# Patient Record
Sex: FEMALE | Race: WHITE | Employment: UNEMPLOYED | URBAN - METROPOLITAN AREA
[De-identification: names, ages, dates, MRNs, and addresses within clinical notes are randomized per-mention and may not be internally consistent; named-entity substitution may affect disease eponyms.]

---

## 2017-03-22 ENCOUNTER — OFFICE VISIT (OUTPATIENT)
Dept: OBGYN | Facility: CLINIC | Age: 52
End: 2017-03-22
Payer: COMMERCIAL

## 2017-03-22 ENCOUNTER — RADIANT APPOINTMENT (OUTPATIENT)
Dept: MAMMOGRAPHY | Facility: CLINIC | Age: 52
End: 2017-03-22
Payer: COMMERCIAL

## 2017-03-22 VITALS
HEIGHT: 66 IN | WEIGHT: 147 LBS | SYSTOLIC BLOOD PRESSURE: 122 MMHG | BODY MASS INDEX: 23.63 KG/M2 | DIASTOLIC BLOOD PRESSURE: 86 MMHG

## 2017-03-22 DIAGNOSIS — Z12.11 SCREEN FOR COLON CANCER: ICD-10-CM

## 2017-03-22 DIAGNOSIS — Z13.29 SCREENING FOR THYROID DISORDER: ICD-10-CM

## 2017-03-22 DIAGNOSIS — Z01.419 ENCOUNTER FOR GYNECOLOGICAL EXAMINATION WITHOUT ABNORMAL FINDING: Primary | ICD-10-CM

## 2017-03-22 DIAGNOSIS — Z13.220 ENCOUNTER FOR LIPID SCREENING FOR CARDIOVASCULAR DISEASE: ICD-10-CM

## 2017-03-22 DIAGNOSIS — Z12.31 VISIT FOR SCREENING MAMMOGRAM: ICD-10-CM

## 2017-03-22 DIAGNOSIS — B02.8 HERPES ZOSTER WITH COMPLICATION: ICD-10-CM

## 2017-03-22 DIAGNOSIS — Z13.1 SCREENING FOR DIABETES MELLITUS: ICD-10-CM

## 2017-03-22 DIAGNOSIS — A60.00 HERPES SIMPLEX INFECTION OF GENITOURINARY SYSTEM: ICD-10-CM

## 2017-03-22 DIAGNOSIS — Z13.6 ENCOUNTER FOR LIPID SCREENING FOR CARDIOVASCULAR DISEASE: ICD-10-CM

## 2017-03-22 LAB — GLUCOSE BLD-MCNC: 91 MG/DL (ref 70–99)

## 2017-03-22 PROCEDURE — 36415 COLL VENOUS BLD VENIPUNCTURE: CPT | Performed by: OBSTETRICS & GYNECOLOGY

## 2017-03-22 PROCEDURE — G0202 SCR MAMMO BI INCL CAD: HCPCS | Mod: TC

## 2017-03-22 PROCEDURE — 99396 PREV VISIT EST AGE 40-64: CPT | Performed by: OBSTETRICS & GYNECOLOGY

## 2017-03-22 PROCEDURE — 80061 LIPID PANEL: CPT | Performed by: OBSTETRICS & GYNECOLOGY

## 2017-03-22 PROCEDURE — 84443 ASSAY THYROID STIM HORMONE: CPT | Performed by: OBSTETRICS & GYNECOLOGY

## 2017-03-22 PROCEDURE — G0145 SCR C/V CYTO,THINLAYER,RESCR: HCPCS | Performed by: OBSTETRICS & GYNECOLOGY

## 2017-03-22 PROCEDURE — 82947 ASSAY GLUCOSE BLOOD QUANT: CPT | Performed by: OBSTETRICS & GYNECOLOGY

## 2017-03-22 RX ORDER — VALACYCLOVIR HYDROCHLORIDE 1 G/1
1000 TABLET, FILM COATED ORAL 3 TIMES DAILY
Qty: 21 TABLET | Refills: 1 | Status: SHIPPED | OUTPATIENT
Start: 2017-03-22 | End: 2019-03-06

## 2017-03-22 RX ORDER — VALACYCLOVIR HYDROCHLORIDE 1 G/1
1000 TABLET, FILM COATED ORAL 3 TIMES DAILY
Qty: 21 TABLET | Refills: 1 | Status: CANCELLED | OUTPATIENT
Start: 2017-03-22

## 2017-03-22 ASSESSMENT — ANXIETY QUESTIONNAIRES
3. WORRYING TOO MUCH ABOUT DIFFERENT THINGS: NOT AT ALL
1. FEELING NERVOUS, ANXIOUS, OR ON EDGE: NOT AT ALL
GAD7 TOTAL SCORE: 0
5. BEING SO RESTLESS THAT IT IS HARD TO SIT STILL: NOT AT ALL
6. BECOMING EASILY ANNOYED OR IRRITABLE: NOT AT ALL
7. FEELING AFRAID AS IF SOMETHING AWFUL MIGHT HAPPEN: NOT AT ALL
2. NOT BEING ABLE TO STOP OR CONTROL WORRYING: NOT AT ALL
IF YOU CHECKED OFF ANY PROBLEMS ON THIS QUESTIONNAIRE, HOW DIFFICULT HAVE THESE PROBLEMS MADE IT FOR YOU TO DO YOUR WORK, TAKE CARE OF THINGS AT HOME, OR GET ALONG WITH OTHER PEOPLE: NOT DIFFICULT AT ALL

## 2017-03-22 ASSESSMENT — PATIENT HEALTH QUESTIONNAIRE - PHQ9: 5. POOR APPETITE OR OVEREATING: NOT AT ALL

## 2017-03-22 NOTE — PROGRESS NOTES
Coby is a 52 year old  female who presents for annual exam.     Besides routine health maintenance, she has no other health concerns today .    HPI: The patient is seen at this time for her annual exam. She has had a supracervical hysterectomy. She is on no replacement therapy and occasionally has hot flashes but no dyspareunia. She denies any stress incontinence but does occasionally have some over flow issues if she has not attended to her bladder very well.Pelvic Exam:  External Genitalia:     Normal appearance for age, no discharge present, no tenderness present, no inflammatory lesions present, color normal  Vagina:     Normal vaginal vault without central or paravaginal defects, no discharge present, no inflammatory lesions present, no masses present  Bladder:     Nontender to palpation  Urethra:   Urethral Body:  Urethra palpation normal, urethra structural support normal   Urethral Meatus:  No erythema or lesions present  Cervix:     Appearance healthy, no lesions present, nontender to palpation, no bleeding present  Uterus:     Surgically absent  Adnexa:     No adnexal tenderness present, no adnexal masses present  Perineum:     Perineum within normal limits, no evidence of trauma, no rashes or skin lesions present  Anus:     Anus within normal limits, no hemorrhoids present  Inguinal Lymph Nodes:     No lymphadenopathy present  Pubic Hair:     Normal pubic hair distribution for age  Genitalia and Groin:     No rashes present, no lesions present, no areas of discoloration, no masses present  The patient's PCP is Kady Lerner at [facility]         GYNECOLOGIC HISTORY:    Patient's last menstrual period was 2008.  Her current contraception method is: hysterectomy.  She  reports that she has never smoked. She has never used smokeless tobacco.    Patient is sexually active.  STD testing offered?  Declined  Last PHQ-9 score on record =   PHQ-9 SCORE 3/22/2017   Total Score 4     Last GAD7  score on record =   ANA-7 SCORE 3/22/2017   Total Score 0     Alcohol Score = 1    HEALTH MAINTENANCE:  Cholesterol: (  Cholesterol   Date Value Ref Range Status   2010 186 0 - 200 mg/dL Final     Comment:     LDL Cholesterol is the primary guide to therapy.   The NCEP recommends further evaluation of: patients with cholesterol <200   mg/dL   if additional risk factors are present, cholesterol >240 mg/dL, triglycerides   >150 mg/dL, or HDL <40 mg/dL.   2008 157 0 - 200 mg/dL Final     Comment:     LDL Cholesterol is the primary guide to therapy: LDL-cholesterol goal in high   risk patients is <100 mg/dL and in very high risk patients is <70 mg/dL.   The NCEP recommends further evaluation of: patients with cholesterol <200 mg/dL   if additional risk factors are present, cholesterol >240 mg/dL, triglycerides   >150 mg/dL, or HDL <40 mg/dL.    5/21/10 Total-186, Trigs-95, HDL-51, LDL-116  Last Mammo: one year ago, Result: normal, Next Mammo: today   Pap:   Colonoscopy:  ifit testing1 year ago, Result: normal, Next Colonoscopy: this years.  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 Vaughn -ob/gyn      Other chronic serous otitis media     EDEMA - right lower extremity - p dermoid teratoma     Uterine leiomyoma     CARDIOVASCULAR SCREENING; LDL GOAL LESS THAN 160     Past Surgical History:   Procedure Laterality Date     HYSTERECTOMY, PAP STILL INDICATED  2009    lap. supracervical hyst with left ovary left in place      SURGICAL HISTORY OF -   at age 26    removal of right ovarian teratoma -  "atrophic right ovary still in place      SURGICAL HISTORY OF -   at age 13     strabismus surgery on both eyes      SURGICAL HISTORY OF -       endometriosis surgery - with some laser - laparoscopic       Social History   Substance Use Topics     Smoking status: Never Smoker     Smokeless tobacco: Never Used     Alcohol use Yes      Comment: 2 to 3 drinks a year.      Problem (# of Occurrences) Relation (Name,Age of Onset)    CANCER (1) Father: stage IV sarcoma of left triceps at age 80  - removed at [facility]- 7/2008    CEREBROVASCULAR DISEASE (2) Maternal Grandfather, Paternal Grandfather    DIABETES (1) Maternal Grandfather    HEART DISEASE (1) Mother    Hypertension (2) Mother, Father    Lipids (2) Mother, Sister    Thyroid Disease (1) Mother            Current Outpatient Prescriptions   Medication Sig     valACYclovir (VALTREX) 1000 mg tablet Take 1 tablet by mouth 3 times daily.     Multiple Vitamin (MULTI-VITAMIN) per tablet Take 1 tablet by mouth daily.     Coenzyme Q10 100 MG CHEW Take  by mouth.     omega-3 fatty acids 1200 MG capsule Take 1 capsule by mouth daily.     meclizine (ANTIVERT) 25 MG tablet Take 1 tablet by mouth every 6 hours as needed.     FLUTICASONE PROPIONATE (NASAL) INHA 50 MCG/DOSE NA 2 sparys each nostril daily     No current facility-administered medications for this visit.      No Known Allergies    Past medical, surgical, social and family histories were reviewed and updated in EPIC.    ROS:   Endocrine: Decreased Libido    EXAM:  /86  Ht 1.664 m (5' 5.5\")  Wt 66.7 kg (147 lb)  LMP 03/19/2008  Breastfeeding? No  BMI 24.09 kg/m2   BMI: Body mass index is 24.09 kg/(m^2).    PHYSICAL EXAM:  Constitutional:  Appearance: Well nourished, well developed, alert, in no acute distress  Neck:  Lymph Nodes:  No lymphadenopathy present    Thyroid:  Gland size normal, nontender, no nodules or masses present  on palpation  Chest:  Respiratory Effort:  Breathing " unlabored  Cardiovascular:    Heart: Auscultation:  Regular rate, normal rhythm, no murmurs present  Breasts: Inspection of Breasts:  No lymphadenopathy present    Palpation of Breasts and Axillae:  No masses present on palpation, no  breast tenderness    Axillary Lymph Nodes:  No lymphadenopathy present  Gastrointestinal:   Abdominal Examination:  Abdomen nontender to palpation, tone normal without rigidity or guarding, no masses present, umbilicus without lesions   Liver and Spleen:  No hepatomegaly present, liver nontender to palpation    Hernias:  No hernias present  Lymphatic: Lymph Nodes:  No other lymphadenopathy present  Skin:  General Inspection:  No rashes present, no lesions present, no areas of  discoloration    Genitalia and Groin:  No rashes present, no lesions present, no areas of  discoloration, no masses present  Neurologic/Psychiatric:    Mental Status:  Oriented X3     Pelvic Exam:  External Genitalia:     Normal appearance for age, no discharge present, no tenderness present, no inflammatory lesions present, color normal  Vagina:     Normal vaginal vault without central or paravaginal defects, no discharge present, no inflammatory lesions present, no masses present  Bladder:     Nontender to palpation  Urethra:   Urethral Body:  Urethra palpation normal, urethra structural support normal   Urethral Meatus:  No erythema or lesions present  Cervix:     Appearance healthy, no lesions present, nontender to palpation, no bleeding present  Uterus:     Surgically absent  Adnexa:     No adnexal tenderness present, no adnexal masses present  Perineum:     Perineum within normal limits, no evidence of trauma, no rashes or skin lesions present  Anus:     Anus within normal limits, no hemorrhoids present  Inguinal Lymph Nodes:     No lymphadenopathy present  Pubic Hair:     Normal pubic hair distribution for age  Genitalia and Groin:     No rashes present, no lesions present, no areas of discoloration, no  masses present    COUNSELING:   Reviewed preventive health counseling, as reflected in patient instructions       Regular exercise       Healthy diet/nutrition    BMI: Body mass index is 24.09 kg/(m^2).      ASSESSMENT:  52 year old female with satisfactory annual exam.      PLAN: We will convey both her Pap smear and mammogram results. She does need  her fasting blood sugar and cholesterol panel this year. She will continue to be screened on an annual basis with Pap smears due to her past history.      Rick Vaughn MD

## 2017-03-23 LAB
CHOLEST SERPL-MCNC: 219 MG/DL
HDLC SERPL-MCNC: 53 MG/DL
LDLC SERPL CALC-MCNC: 144 MG/DL
NONHDLC SERPL-MCNC: 166 MG/DL
TRIGL SERPL-MCNC: 112 MG/DL
TSH SERPL DL<=0.005 MIU/L-ACNC: 2.07 MU/L (ref 0.4–4)

## 2017-03-23 ASSESSMENT — PATIENT HEALTH QUESTIONNAIRE - PHQ9: SUM OF ALL RESPONSES TO PHQ QUESTIONS 1-9: 4

## 2017-03-23 ASSESSMENT — ANXIETY QUESTIONNAIRES: GAD7 TOTAL SCORE: 0

## 2017-03-24 LAB
COPATH REPORT: NORMAL
PAP: NORMAL

## 2017-03-24 NOTE — PROGRESS NOTES
Coby      Your results are normal.  If further questions please give me a call.  Cholesterol and LDL(bad cholesterol) are borderline elevated.  Exercise and watching diet will help lower.  Repeat next hear.  Please call if questions.    Rick Ruvalcaba MD

## 2017-10-12 ENCOUNTER — TRANSFERRED RECORDS (OUTPATIENT)
Dept: HEALTH INFORMATION MANAGEMENT | Facility: CLINIC | Age: 52
End: 2017-10-12

## 2019-01-23 ENCOUNTER — TELEPHONE (OUTPATIENT)
Dept: OBGYN | Facility: CLINIC | Age: 54
End: 2019-01-23

## 2019-01-23 DIAGNOSIS — Z13.820 SCREENING FOR OSTEOPOROSIS: Primary | ICD-10-CM

## 2019-03-05 NOTE — PROGRESS NOTES
Coby is a 53 year old  female who presents for annual exam.     Besides routine health maintenance, she has no other health concerns today .    HPI: The patient is seen at this time for her annual exam.  She is doing on a daily basis with a father with lung cancer and hospice.  Her overall health is fine.  She is concerned about her weight but is working out on a regular basis.  She has no GYN complaints.  The patient's PCP is  Rick Vaughn MD.        GYNECOLOGIC HISTORY:    Patient's last menstrual period was 2008.  Her current contraception method is: hysterectomy.  She  reports that  has never smoked. she has never used smokeless tobacco.    Patient is sexually active.  STD testing offered?  Declined  Last PHQ-9 score on record =   PHQ-9 SCORE 3/6/2019   PHQ-9 Total Score 3     Last GAD7 score on record =   ANA-7 SCORE 3/6/2019   Total Score 4     Alcohol Score = 1    HEALTH MAINTENANCE:  Cholesterol: 3/22/2017   Total= 219, Triglycerides=112, HDL=53, YDQ=891  Cholesterol   Date Value Ref Range Status   2017 219 (H) <200 mg/dL Final     Comment:     Desirable:       <200 mg/dl   2010 186 0 - 200 mg/dL Final     Comment:     LDL Cholesterol is the primary guide to therapy.   The NCEP recommends further evaluation of: patients with cholesterol <200   mg/dL   if additional risk factors are present, cholesterol >240 mg/dL, triglycerides   >150 mg/dL, or HDL <40 mg/dL.      Last Mammo: 3/22/2017, Result: normal, Next Mammo: today   Pap: 3/22/2017 NIL  Lab Results   Component Value Date    PAP NIL 2017      Colonoscopy:  NA, Result: not applicable, Next Colonoscopy: Due/pt does fecal.  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 Vaughn -ob/gyn      Other chronic serous otitis media     EDEMA - right lower extremity - p dermoid teratoma     Uterine leiomyoma     CARDIOVASCULAR SCREENING; LDL GOAL LESS THAN 160     Past Surgical History:   Procedure Laterality Date     HYSTERECTOMY, PAP STILL INDICATED  1/2009    lap. supracervical hyst with left ovary left in place      SURGICAL HISTORY OF -   at age 26    removal of right ovarian teratoma - atrophic right ovary still in place      SURGICAL HISTORY OF -   at age 13     strabismus surgery on both eyes      SURGICAL HISTORY OF -       endometriosis surgery - with some laser - laparoscopic       Social History     Tobacco Use     Smoking status: Never Smoker     Smokeless tobacco: Never Used   Substance Use Topics     Alcohol use: Yes     Comment: 2 to 3 drinks a year.      Problem (# of Occurrences) Relation (Name,Age of Onset)    Cancer (1) Father: stage IV sarcoma of left triceps at age 80  - removed at [facility]- 7/2008    Cerebrovascular Disease (2) Maternal Grandfather, Paternal Grandfather    Diabetes (1) Maternal Grandfather    Heart Disease (1) Mother    Hypertension (2) Mother, Father    Lipids (2) Mother, Sister    Thyroid Disease (1) Mother            Current Outpatient Medications   Medication Sig     FLUTICASONE PROPIONATE (NASAL) INHA 50 MCG/DOSE NA 2 sparys each nostril daily     Multiple Vitamin (MULTI-VITAMIN) per tablet Take 1 tablet by mouth daily.     valACYclovir (VALTREX) 1000 mg tablet Take 1 tablet (1,000 mg) by mouth 3 times daily     No current facility-administered medications for this visit.      No Known Allergies    Past medical, surgical, social and family histories were reviewed and updated in EPIC.    ROS:   12 point review of systems negative other than symptoms noted  "below.  Constitutional: Weight Gain  Genitourinary: Hot Flashes    EXAM:  /84   Pulse 74   Ht 1.651 m (5' 5\")   Wt 68 kg (150 lb)   LMP 03/19/2008   Breastfeeding? No   BMI 24.96 kg/m     BMI: Body mass index is 24.96 kg/m .    PHYSICAL EXAM:  Constitutional:  Appearance: Well nourished, well developed, alert, in no acute distress  Neck:  Lymph Nodes:  No lymphadenopathy present    Thyroid:  Gland size normal, nontender, no nodules or masses present  on palpation  Chest:  Respiratory Effort:  Breathing unlabored  Cardiovascular:    Heart: Auscultation:  Regular rate, normal rhythm, no murmurs present  Breasts: Inspection of Breasts:  No lymphadenopathy present., Palpation of Breasts and Axillae:  No masses present on palpation, no breast tenderness., Axillary Lymph Nodes:  No lymphadenopathy present. and No nodularity, asymmetry or nipple discharge bilaterally.  Gastrointestinal:   Abdominal Examination:  Abdomen nontender to palpation, tone normal without rigidity or guarding, no masses present, umbilicus without lesions   Liver and Spleen:  No hepatomegaly present, liver nontender to palpation    Hernias:  No hernias present  Lymphatic: Lymph Nodes:  No other lymphadenopathy present  Skin:  General Inspection:  No rashes present, no lesions present, no areas of  discoloration    Genitalia and Groin:  No rashes present, no lesions present, no areas of  discoloration, no masses present  Neurologic/Psychiatric:    Mental Status:  Oriented X3     Pelvic Exam:  External Genitalia:     Normal appearance for age, no discharge present, no tenderness present, no inflammatory lesions present, color normal  Vagina:     Normal vaginal vault without central or paravaginal defects, no discharge present, no inflammatory lesions present, no masses present  Bladder:     Nontender to palpation  Urethra:   Urethral Body:  Urethra palpation normal, urethra structural support normal   Urethral Meatus:  No erythema or " lesions present  Cervix:     Appearance healthy, no lesions present, nontender to palpation, no bleeding present  Uterus:     Surgically absent  Adnexa:     No adnexal tenderness present, no adnexal masses present  Perineum:     Perineum within normal limits, no evidence of trauma, no rashes or skin lesions present  Anus:     Anus within normal limits, no hemorrhoids present  Inguinal Lymph Nodes:     No lymphadenopathy present  Pubic Hair:     Normal pubic hair distribution for age  Genitalia and Groin:     No rashes present, no lesions present, no areas of discoloration, no masses present      COUNSELING:   Reviewed preventive health counseling, as reflected in patient instructions       Regular exercise       Healthy diet/nutrition    BMI: Body mass index is 24.96 kg/m .      ASSESSMENT:  53 year old female with satisfactory annual exam.    ICD-10-CM    1. Encounter for gynecological examination without abnormal finding Z01.419 Pap imaged thin layer screen with HPV - recommended age 30 - 65     HPV High Risk Types DNA Cervical   2. Screening for cardiovascular condition Z13.6 Lipid panel reflex to direct LDL Fasting   3. Screening for thyroid disorder Z13.29 TSH   4. Screening for diabetes mellitus Z13.1 Glucose, whole blood   5. Herpes zoster with complication B02.8 valACYclovir (VALTREX) 1000 mg tablet       PLAN: We will contact the patient with her screening labs and mammogram.  Her bone densitometry is excellent.  We reinforced exercise calcium vitamin D and a good multivitamin.      Rick Vaughn MD

## 2019-03-06 ENCOUNTER — ANCILLARY PROCEDURE (OUTPATIENT)
Dept: MAMMOGRAPHY | Facility: CLINIC | Age: 54
End: 2019-03-06
Attending: OBSTETRICS & GYNECOLOGY
Payer: COMMERCIAL

## 2019-03-06 ENCOUNTER — OFFICE VISIT (OUTPATIENT)
Dept: OBGYN | Facility: CLINIC | Age: 54
End: 2019-03-06
Attending: OBSTETRICS & GYNECOLOGY
Payer: COMMERCIAL

## 2019-03-06 ENCOUNTER — ANCILLARY PROCEDURE (OUTPATIENT)
Dept: BONE DENSITY | Facility: CLINIC | Age: 54
End: 2019-03-06
Attending: OBSTETRICS & GYNECOLOGY
Payer: COMMERCIAL

## 2019-03-06 VITALS
BODY MASS INDEX: 24.99 KG/M2 | SYSTOLIC BLOOD PRESSURE: 122 MMHG | WEIGHT: 150 LBS | HEART RATE: 74 BPM | HEIGHT: 65 IN | DIASTOLIC BLOOD PRESSURE: 84 MMHG

## 2019-03-06 DIAGNOSIS — Z13.29 SCREENING FOR THYROID DISORDER: ICD-10-CM

## 2019-03-06 DIAGNOSIS — Z13.820 SCREENING FOR OSTEOPOROSIS: ICD-10-CM

## 2019-03-06 DIAGNOSIS — Z01.419 ENCOUNTER FOR GYNECOLOGICAL EXAMINATION WITHOUT ABNORMAL FINDING: Primary | ICD-10-CM

## 2019-03-06 DIAGNOSIS — Z13.6 SCREENING FOR CARDIOVASCULAR CONDITION: ICD-10-CM

## 2019-03-06 DIAGNOSIS — Z13.1 SCREENING FOR DIABETES MELLITUS: ICD-10-CM

## 2019-03-06 DIAGNOSIS — B02.8 HERPES ZOSTER WITH COMPLICATION: ICD-10-CM

## 2019-03-06 DIAGNOSIS — Z12.31 VISIT FOR SCREENING MAMMOGRAM: ICD-10-CM

## 2019-03-06 LAB — GLUCOSE BLD-MCNC: 85 MG/DL (ref 70–99)

## 2019-03-06 PROCEDURE — 77067 SCR MAMMO BI INCL CAD: CPT | Mod: TC

## 2019-03-06 PROCEDURE — 80061 LIPID PANEL: CPT | Performed by: OBSTETRICS & GYNECOLOGY

## 2019-03-06 PROCEDURE — 87624 HPV HI-RISK TYP POOLED RSLT: CPT | Performed by: OBSTETRICS & GYNECOLOGY

## 2019-03-06 PROCEDURE — 77080 DXA BONE DENSITY AXIAL: CPT | Mod: TC

## 2019-03-06 PROCEDURE — G0145 SCR C/V CYTO,THINLAYER,RESCR: HCPCS | Performed by: OBSTETRICS & GYNECOLOGY

## 2019-03-06 PROCEDURE — 36415 COLL VENOUS BLD VENIPUNCTURE: CPT | Performed by: OBSTETRICS & GYNECOLOGY

## 2019-03-06 PROCEDURE — 84443 ASSAY THYROID STIM HORMONE: CPT | Performed by: OBSTETRICS & GYNECOLOGY

## 2019-03-06 PROCEDURE — 82947 ASSAY GLUCOSE BLOOD QUANT: CPT | Performed by: OBSTETRICS & GYNECOLOGY

## 2019-03-06 PROCEDURE — 99396 PREV VISIT EST AGE 40-64: CPT | Performed by: OBSTETRICS & GYNECOLOGY

## 2019-03-06 PROCEDURE — G0124 SCREEN C/V THIN LAYER BY MD: HCPCS | Performed by: OBSTETRICS & GYNECOLOGY

## 2019-03-06 RX ORDER — VALACYCLOVIR HYDROCHLORIDE 1 G/1
1000 TABLET, FILM COATED ORAL 3 TIMES DAILY
Qty: 21 TABLET | Refills: 3 | Status: SHIPPED | OUTPATIENT
Start: 2019-03-06 | End: 2020-11-16

## 2019-03-06 ASSESSMENT — ANXIETY QUESTIONNAIRES
IF YOU CHECKED OFF ANY PROBLEMS ON THIS QUESTIONNAIRE, HOW DIFFICULT HAVE THESE PROBLEMS MADE IT FOR YOU TO DO YOUR WORK, TAKE CARE OF THINGS AT HOME, OR GET ALONG WITH OTHER PEOPLE: NOT DIFFICULT AT ALL
3. WORRYING TOO MUCH ABOUT DIFFERENT THINGS: SEVERAL DAYS
GAD7 TOTAL SCORE: 4
2. NOT BEING ABLE TO STOP OR CONTROL WORRYING: SEVERAL DAYS
7. FEELING AFRAID AS IF SOMETHING AWFUL MIGHT HAPPEN: SEVERAL DAYS
6. BECOMING EASILY ANNOYED OR IRRITABLE: NOT AT ALL
5. BEING SO RESTLESS THAT IT IS HARD TO SIT STILL: NOT AT ALL
1. FEELING NERVOUS, ANXIOUS, OR ON EDGE: NOT AT ALL

## 2019-03-06 ASSESSMENT — MIFFLIN-ST. JEOR: SCORE: 1286.28

## 2019-03-06 ASSESSMENT — PATIENT HEALTH QUESTIONNAIRE - PHQ9
SUM OF ALL RESPONSES TO PHQ QUESTIONS 1-9: 3
5. POOR APPETITE OR OVEREATING: SEVERAL DAYS

## 2019-03-07 LAB
CHOLEST SERPL-MCNC: 257 MG/DL
HDLC SERPL-MCNC: 59 MG/DL
LDLC SERPL CALC-MCNC: 178 MG/DL
NONHDLC SERPL-MCNC: 198 MG/DL
TRIGL SERPL-MCNC: 101 MG/DL
TSH SERPL DL<=0.005 MIU/L-ACNC: 2.18 MU/L (ref 0.4–4)

## 2019-03-07 ASSESSMENT — ANXIETY QUESTIONNAIRES: GAD7 TOTAL SCORE: 4

## 2019-03-12 LAB
COPATH REPORT: NORMAL
PAP: NORMAL

## 2019-03-13 LAB
FINAL DIAGNOSIS: NORMAL
HPV HR 12 DNA CVX QL NAA+PROBE: NEGATIVE
HPV16 DNA SPEC QL NAA+PROBE: NEGATIVE
HPV18 DNA SPEC QL NAA+PROBE: NEGATIVE
SPECIMEN DESCRIPTION: NORMAL
SPECIMEN SOURCE CVX/VAG CYTO: NORMAL

## 2019-10-03 DIAGNOSIS — Z12.11 SCREEN FOR COLON CANCER: ICD-10-CM

## 2019-10-03 PROCEDURE — 82274 ASSAY TEST FOR BLOOD FECAL: CPT | Performed by: OBSTETRICS & GYNECOLOGY

## 2019-10-05 LAB — HEMOCCULT STL QL IA: NEGATIVE

## 2020-07-17 NOTE — PROGRESS NOTES
Coby is a 55 year old  female who presents for annual exam.     Besides routine health maintenance, she has no other health concerns today .    HPI: Patient is seen for annual exam at this time.  She is menopausal and has a terrible sleep pattern with hot flashes night sweats and now avoids sexual activity due to vaginal dryness and irritation.  She has no family members with breast cancer, ovarian cancer or cardiovascular disease.  Her father  of lung cancer this last fall.  The patient does not have a PCP        GYNECOLOGIC HISTORY:    Patient's last menstrual period was 2008.      Her current contraception method is: menopause.  She  reports that she has never smoked. She has never used smokeless tobacco.    Patient is sexually active.  STD testing offered?  Declined  Last PHQ-9 score on record =   PHQ-9 SCORE 3/6/2019   PHQ-9 Total Score 3     Last GAD7 score on record =   ANA-7 SCORE 3/6/2019   Total Score 4     Alcohol Score = 2    HEALTH MAINTENANCE:  Cholesterol:   Cholesterol   Date Value Ref Range Status   2019 257 (H) <200 mg/dL Final     Comment:     Desirable:       <200 mg/dl   2017 219 (H) <200 mg/dL Final     Comment:     Desirable:       <200 mg/dl      Last Mammo: One year ago, Result: Normal, Next Mammo: Today   Pap:   Lab Results   Component Value Date    PAP NIL 2019    PAP NIL 2017    3/6/19 WNL HPV (-)neg  Colonoscopy:  Does fecal occult- 10/2/19, Result: Normal, Next Colonoscopy: rpt annually   Dexa:  3/6/19    Health maintenance updated:  yes    HISTORY:  OB History    Para Term  AB Living   1 1 1 0 0 1   SAB TAB Ectopic Multiple Live Births   0 0 0 0 1      # Outcome Date GA Lbr Benny/2nd Weight Sex Delivery Anes PTL Lv   1 Term 00 40w0d  3.033 kg (6 lb 11 oz) F    LATHA      Birth Comments: pre-eclampsia       Name: Quentin        Patient Active Problem List   Diagnosis     Female infertility     BENIGN NEOPLASM OVARY-dermoid  teratoma  in HS-s/p right oophorectomy      OVARIAN ENDOMETRIOSIS- Dr. Vaughn -ob/gyn      Other chronic serous otitis media     EDEMA - right lower extremity - p dermoid teratoma     Uterine leiomyoma     CARDIOVASCULAR SCREENING; LDL GOAL LESS THAN 160     Screening for cervical cancer     Past Surgical History:   Procedure Laterality Date     HYSTERECTOMY, PAP STILL INDICATED  1/2009    lap. supracervical hyst with left ovary left in place      SURGICAL HISTORY OF -   at age 26    removal of right ovarian teratoma - atrophic right ovary still in place      SURGICAL HISTORY OF -   at age 13     strabismus surgery on both eyes      SURGICAL HISTORY OF -       endometriosis surgery - with some laser - laparoscopic       Social History     Tobacco Use     Smoking status: Never Smoker     Smokeless tobacco: Never Used   Substance Use Topics     Alcohol use: Yes     Comment: 2 to 3 drinks a year.      Problem (# of Occurrences) Relation (Name,Age of Onset)    Cancer (1) Father: stage IV sarcoma of left triceps at age 80  - removed [facility]- 7/2008    Cerebrovascular Disease (2) Maternal Grandfather, Paternal Grandfather    Diabetes (1) Maternal Grandfather    Heart Disease (1) Mother    Hypertension (2) Mother, Father    Lipids (2) Mother, Sister    Thyroid Disease (1) Mother            Current Outpatient Medications   Medication Sig     FLUTICASONE PROPIONATE (NASAL) INHA 50 MCG/DOSE NA 2 sparys each nostril daily     Multiple Vitamin (MULTI-VITAMIN) per tablet Take 1 tablet by mouth daily.     valACYclovir (VALTREX) 1000 mg tablet Take 1 tablet (1,000 mg) by mouth 3 times daily     No current facility-administered medications for this visit.      No Known Allergies    Past medical, surgical, social and family histories were reviewed and updated in EPIC.    ROS:   12 point review of systems negative other than symptoms noted below or in the HPI.  No urinary frequency or dysuria, bladder or kidney  "problems    EXAM:  Ht 1.651 m (5' 5\")   Wt 69.9 kg (154 lb)   LMP 03/19/2008   Breastfeeding No   BMI 25.63 kg/m     BMI: Body mass index is 25.63 kg/m .    PHYSICAL EXAM:  Constitutional:   Appearance: Well nourished, well developed, alert, in no acute distress  Neck:  Lymph Nodes:  No lymphadenopathy present    Thyroid:  Gland size normal, nontender, no nodules or masses present  on palpation  Chest:  Respiratory Effort:  Breathing unlabored  Cardiovascular:    Heart: Auscultation:  Regular rate, normal rhythm, no murmurs present  Breasts: Inspection of Breasts:  No lymphadenopathy present., Palpation of Breasts and Axillae:  No masses present on palpation, no breast tenderness., Axillary Lymph Nodes:  No lymphadenopathy present. and No nodularity, asymmetry or nipple discharge bilaterally.  Gastrointestinal:   Abdominal Examination:  Abdomen nontender to palpation, tone normal without rigidity or guarding, no masses present, umbilicus without lesions   Liver and Spleen:  No hepatomegaly present, liver nontender to palpation    Hernias:  No hernias present  Lymphatic: Lymph Nodes:  No other lymphadenopathy present  Skin:  General Inspection:  No rashes present, no lesions present, no areas of  discoloration  Neurologic:    Mental Status:  Oriented X3.  Normal strength and tone, sensory exam                grossly normal, mentation intact and speech normal.    Psychiatric:   Mentation appears normal and affect normal/bright.         Pelvic Exam:  External Genitalia:     Normal appearance for age, no discharge present, no tenderness present, no inflammatory lesions present, color normal  Vagina:     Normal vaginal vault without central or paravaginal defects, no discharge present, no inflammatory lesions present, no masses present  Bladder:     Nontender to palpation  Urethra:   Urethral Body:  Urethra palpation normal, urethra structural support normal   Urethral Meatus:  No erythema or lesions present  Cervix: "     Appearance healthy, no lesions present, nontender to palpation, no bleeding present  Uterus:     Surgically absent  Adnexa:     No adnexal tenderness present, no adnexal masses present  Perineum:     Perineum within normal limits, no evidence of trauma, no rashes or skin lesions present  Anus:     Anus within normal limits, no hemorrhoids present  Inguinal Lymph Nodes:     No lymphadenopathy present  Pubic Hair:     Normal pubic hair distribution for age  Genitalia and Groin:     No rashes present, no lesions present, no areas of discoloration, no masses present      COUNSELING:   Reviewed preventive health counseling, as reflected in patient instructions       Regular exercise       Healthy diet/nutrition    BMI: Body mass index is 25.63 kg/m .  Weight management plan: Discussed healthy diet and exercise guidelines    ASSESSMENT:  55 year old female with satisfactory annual exam.    ICD-10-CM    1. Encounter for gynecological examination without abnormal finding  Z01.419        PLAN: Healthy postmenopausal female who is finally become symptomatic enough that she would like to initiate both oral therapy for hot flashes and some vaginal cream for her atrophic vaginitis.  The risks and complications have been reviewed and accepted.  We will convey her Pap and mammogram results.      Rick Vaughn MD

## 2020-07-20 ENCOUNTER — OFFICE VISIT (OUTPATIENT)
Dept: OBGYN | Facility: CLINIC | Age: 55
End: 2020-07-20
Attending: OBSTETRICS & GYNECOLOGY
Payer: COMMERCIAL

## 2020-07-20 ENCOUNTER — ANCILLARY PROCEDURE (OUTPATIENT)
Dept: MAMMOGRAPHY | Facility: CLINIC | Age: 55
End: 2020-07-20
Attending: OBSTETRICS & GYNECOLOGY
Payer: COMMERCIAL

## 2020-07-20 VITALS
BODY MASS INDEX: 25.66 KG/M2 | HEART RATE: 70 BPM | DIASTOLIC BLOOD PRESSURE: 60 MMHG | WEIGHT: 154 LBS | SYSTOLIC BLOOD PRESSURE: 112 MMHG | HEIGHT: 65 IN

## 2020-07-20 DIAGNOSIS — Z13.6 ENCOUNTER FOR LIPID SCREENING FOR CARDIOVASCULAR DISEASE: ICD-10-CM

## 2020-07-20 DIAGNOSIS — Z13.1 SCREENING FOR DIABETES MELLITUS: ICD-10-CM

## 2020-07-20 DIAGNOSIS — N95.1 SYMPTOMS, SUCH AS FLUSHING, SLEEPLESSNESS, HEADACHE, LACK OF CONCENTRATION, ASSOCIATED WITH THE MENOPAUSE: ICD-10-CM

## 2020-07-20 DIAGNOSIS — Z13.29 SCREENING FOR THYROID DISORDER: ICD-10-CM

## 2020-07-20 DIAGNOSIS — Z12.11 SCREEN FOR COLON CANCER: ICD-10-CM

## 2020-07-20 DIAGNOSIS — Z01.419 ENCOUNTER FOR GYNECOLOGICAL EXAMINATION WITHOUT ABNORMAL FINDING: Primary | ICD-10-CM

## 2020-07-20 DIAGNOSIS — Z12.31 VISIT FOR SCREENING MAMMOGRAM: ICD-10-CM

## 2020-07-20 DIAGNOSIS — Z13.220 ENCOUNTER FOR LIPID SCREENING FOR CARDIOVASCULAR DISEASE: ICD-10-CM

## 2020-07-20 LAB — GLUCOSE BLD-MCNC: 81 MG/DL (ref 70–99)

## 2020-07-20 PROCEDURE — 80061 LIPID PANEL: CPT | Performed by: OBSTETRICS & GYNECOLOGY

## 2020-07-20 PROCEDURE — 87624 HPV HI-RISK TYP POOLED RSLT: CPT | Performed by: OBSTETRICS & GYNECOLOGY

## 2020-07-20 PROCEDURE — 82947 ASSAY GLUCOSE BLOOD QUANT: CPT | Performed by: OBSTETRICS & GYNECOLOGY

## 2020-07-20 PROCEDURE — 84443 ASSAY THYROID STIM HORMONE: CPT | Performed by: OBSTETRICS & GYNECOLOGY

## 2020-07-20 PROCEDURE — 77063 BREAST TOMOSYNTHESIS BI: CPT | Mod: TC

## 2020-07-20 PROCEDURE — 36415 COLL VENOUS BLD VENIPUNCTURE: CPT | Performed by: OBSTETRICS & GYNECOLOGY

## 2020-07-20 PROCEDURE — 77067 SCR MAMMO BI INCL CAD: CPT | Mod: TC

## 2020-07-20 PROCEDURE — G0123 SCREEN CERV/VAG THIN LAYER: HCPCS | Performed by: OBSTETRICS & GYNECOLOGY

## 2020-07-20 PROCEDURE — 99396 PREV VISIT EST AGE 40-64: CPT | Performed by: OBSTETRICS & GYNECOLOGY

## 2020-07-20 RX ORDER — CETIRIZINE HYDROCHLORIDE 10 MG/1
10 TABLET ORAL DAILY
COMMUNITY

## 2020-07-20 RX ORDER — ESTRADIOL 1 MG/1
1 TABLET ORAL DAILY
Qty: 90 TABLET | Refills: 3 | Status: SHIPPED | OUTPATIENT
Start: 2020-07-20 | End: 2021-07-07

## 2020-07-20 RX ORDER — VIT C/B6/B5/MAGNESIUM/HERB 173 50-5-6-5MG
CAPSULE ORAL
COMMUNITY
End: 2022-10-03

## 2020-07-20 RX ORDER — ESTRADIOL 0.1 MG/G
1 CREAM VAGINAL DAILY
Qty: 42.5 G | Refills: 6 | Status: SHIPPED | OUTPATIENT
Start: 2020-07-20 | End: 2021-04-16

## 2020-07-20 ASSESSMENT — ANXIETY QUESTIONNAIRES
IF YOU CHECKED OFF ANY PROBLEMS ON THIS QUESTIONNAIRE, HOW DIFFICULT HAVE THESE PROBLEMS MADE IT FOR YOU TO DO YOUR WORK, TAKE CARE OF THINGS AT HOME, OR GET ALONG WITH OTHER PEOPLE: SOMEWHAT DIFFICULT
6. BECOMING EASILY ANNOYED OR IRRITABLE: SEVERAL DAYS
7. FEELING AFRAID AS IF SOMETHING AWFUL MIGHT HAPPEN: SEVERAL DAYS
GAD7 TOTAL SCORE: 6
3. WORRYING TOO MUCH ABOUT DIFFERENT THINGS: SEVERAL DAYS
2. NOT BEING ABLE TO STOP OR CONTROL WORRYING: SEVERAL DAYS
5. BEING SO RESTLESS THAT IT IS HARD TO SIT STILL: NOT AT ALL
1. FEELING NERVOUS, ANXIOUS, OR ON EDGE: SEVERAL DAYS

## 2020-07-20 ASSESSMENT — PATIENT HEALTH QUESTIONNAIRE - PHQ9
SUM OF ALL RESPONSES TO PHQ QUESTIONS 1-9: 7
5. POOR APPETITE OR OVEREATING: SEVERAL DAYS

## 2020-07-20 ASSESSMENT — MIFFLIN-ST. JEOR: SCORE: 1294.42

## 2020-07-21 LAB
CHOLEST SERPL-MCNC: 276 MG/DL
HDLC SERPL-MCNC: 57 MG/DL
LDLC SERPL CALC-MCNC: 194 MG/DL
NONHDLC SERPL-MCNC: 219 MG/DL
TRIGL SERPL-MCNC: 127 MG/DL
TSH SERPL DL<=0.005 MIU/L-ACNC: 2.07 MU/L (ref 0.4–4)

## 2020-07-21 ASSESSMENT — ANXIETY QUESTIONNAIRES: GAD7 TOTAL SCORE: 6

## 2020-07-22 ENCOUNTER — MYC MEDICAL ADVICE (OUTPATIENT)
Dept: OBGYN | Facility: CLINIC | Age: 55
End: 2020-07-22

## 2020-07-22 DIAGNOSIS — E78.2 MIXED HYPERLIPIDEMIA: Primary | ICD-10-CM

## 2020-07-22 LAB
COPATH REPORT: NORMAL
PAP: NORMAL

## 2020-07-22 RX ORDER — SIMVASTATIN 5 MG
5 TABLET ORAL AT BEDTIME
Qty: 90 TABLET | Refills: 1 | Status: SHIPPED | OUTPATIENT
Start: 2020-07-22 | End: 2021-01-04

## 2020-07-22 NOTE — TELEPHONE ENCOUNTER
Replied via [employer] sent.   Future labs ordered for 6 month recheck of fasting labs and liver function.

## 2020-07-22 NOTE — TELEPHONE ENCOUNTER
Recent annual with lipids 7/20/20    Routing pt mychart message to provider to advise.  Coby Parson RN on 7/22/2020 at 11:02 AM

## 2020-08-26 DIAGNOSIS — Z12.11 SCREEN FOR COLON CANCER: ICD-10-CM

## 2020-08-26 PROCEDURE — 82274 ASSAY TEST FOR BLOOD FECAL: CPT | Performed by: OBSTETRICS & GYNECOLOGY

## 2020-09-01 LAB — HEMOCCULT STL QL IA: NEGATIVE

## 2020-10-16 NOTE — PROGRESS NOTES
SUBJECTIVE:                                                   Coby Poe is a 55 year old female who presents to clinic today for the following health issue(s):  No chief complaint on file.      HPI: The patient is seen at this time for follow-up of menopausal therapy.  The patient was having severe hot flashes and vaginal dryness and avoidance dyspareunia.  She has initiated the oral component and is now sleeping very well with no hot flashes.  She has had no headaches visual blurring or breast tenderness.  She did not  the vaginal cream due to expense but feels that there is more vaginal lubrication at this time and she is satisfied.  She has no other health issues that are complicating things other than the normal anxiety from the pandemic.      Patient's last menstrual period was 2008..     Patient is sexually active, .  Using hysterectomy for contraception.    reports that she has never smoked. She has never used smokeless tobacco.    STD testing offered?  Declined    Health maintenance updated:  yes    Today's PHQ-2 Score:   PHQ-2 (  [employer]) 3/6/2019   Q1: Little interest or pleasure in doing things 0   Q2: Feeling down, depressed or hopeless 1   PHQ-2 Score 1     Today's PHQ-9 Score:   PHQ-9 SCORE 2020   PHQ-9 Total Score 7     Today's ANA-7 Score:   ANA-7 SCORE 2020   Total Score 6       Problem list and histories reviewed & adjusted, as indicated.  Additional history: as documented.    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 Vaughn -ob/gyn      Other chronic serous otitis media     EDEMA - right lower extremity - p dermoid teratoma     Uterine leiomyoma     CARDIOVASCULAR SCREENING; LDL GOAL LESS THAN 160     Screening for cervical cancer     Past Surgical History:   Procedure Laterality Date     HYSTERECTOMY, PAP STILL INDICATED  2009     lap. supracervical hyst with left ovary left in place      SURGICAL HISTORY OF -   at age 26    removal of right ovarian teratoma - atrophic right ovary still in place      SURGICAL HISTORY OF -   at age 13     strabismus surgery on both eyes      SURGICAL HISTORY OF -       endometriosis surgery - with some laser - laparoscopic       Social History     Tobacco Use     Smoking status: Never Smoker     Smokeless tobacco: Never Used   Substance Use Topics     Alcohol use: Yes     Comment: 2 to 3 drinks a year.      Problem (# of Occurrences) Relation (Name,Age of Onset)    Cancer (1) Father (80): stage IV sarcoma of left triceps at age 80  - removed at [facility]- 7/2008    Cerebrovascular Disease (2) Maternal Grandfather, Paternal Grandfather    Diabetes (1) Maternal Grandfather    Heart Disease (1) Mother    Hypertension (2) Mother, Father    Lipids (2) Mother, Sister    No Known Problems (5) Sister, Brother, Maternal Grandmother, Paternal Grandmother, Other    Thyroid Disease (1) Mother            Current Outpatient Medications   Medication Sig     cetirizine (ZYRTEC) 10 MG tablet Take 10 mg by mouth daily     estradiol (ESTRACE VAGINAL) 0.1 MG/GM vaginal cream Place 1 g vaginally daily     estradiol (ESTRACE) 1 MG tablet Take 1 tablet (1 mg) by mouth daily     FLUTICASONE PROPIONATE (NASAL) INHA 50 MCG/DOSE NA 2 sparys each nostril daily     Multiple Vitamin (MULTI-VITAMIN) per tablet Take 1 tablet by mouth daily.     simvastatin (ZOCOR) 5 MG tablet Take 1 tablet (5 mg) by mouth At Bedtime     Turmeric 500 MG CAPS      valACYclovir (VALTREX) 1000 mg tablet Take 1 tablet (1,000 mg) by mouth 3 times daily     No current facility-administered medications for this visit.      No Known Allergies    ROS:  12 point review of systems negative other than symptoms noted below or in the HPI.  No urinary frequency or dysuria, bladder or kidney problems      OBJECTIVE:     LMP 03/19/2008   There is no height or weight on file  to calculate BMI.    Exam:  Constitutional:  Appearance: Well nourished, well developed alert, in no acute distress     In-Clinic Test Results:      ASSESSMENT/PLAN:                                                        Excellent follow-up of initiation of estrogen replacement therapy.  The patient is doing very well with positive effects and no side effects.  She did not initiate the vaginal cream due to expense but seems to be better on that front also.  She has questions of when to follow-up her elevated cholesterol for which she initiated simvastatin therapy.  She has no side effects of that medication.  We will follow-up CMP and CBC in the spring.          Rick Vaughn MD  [facility] [place]

## 2020-10-19 ENCOUNTER — OFFICE VISIT (OUTPATIENT)
Dept: OBGYN | Facility: CLINIC | Age: 55
End: 2020-10-19
Payer: COMMERCIAL

## 2020-10-19 VITALS — BODY MASS INDEX: 26.29 KG/M2 | WEIGHT: 158 LBS | DIASTOLIC BLOOD PRESSURE: 78 MMHG | SYSTOLIC BLOOD PRESSURE: 118 MMHG

## 2020-10-19 DIAGNOSIS — N95.1 SYMPTOMS, SUCH AS FLUSHING, SLEEPLESSNESS, HEADACHE, LACK OF CONCENTRATION, ASSOCIATED WITH THE MENOPAUSE: ICD-10-CM

## 2020-10-19 DIAGNOSIS — Z13.228 SCREENING FOR METABOLIC DISORDER: Primary | ICD-10-CM

## 2020-10-19 DIAGNOSIS — Z13.0 SCREENING FOR DISORDER OF BLOOD AND BLOOD-FORMING ORGANS: ICD-10-CM

## 2020-10-19 PROCEDURE — 99212 OFFICE O/P EST SF 10 MIN: CPT | Performed by: OBSTETRICS & GYNECOLOGY

## 2020-11-16 ENCOUNTER — MYC REFILL (OUTPATIENT)
Dept: OBGYN | Facility: CLINIC | Age: 55
End: 2020-11-16

## 2020-11-16 DIAGNOSIS — B02.8 HERPES ZOSTER WITH COMPLICATION: ICD-10-CM

## 2020-11-16 RX ORDER — VALACYCLOVIR HYDROCHLORIDE 1 G/1
1000 TABLET, FILM COATED ORAL 3 TIMES DAILY
Qty: 21 TABLET | Refills: 3 | Status: SHIPPED | OUTPATIENT
Start: 2020-11-16 | End: 2021-11-05

## 2020-11-16 NOTE — TELEPHONE ENCOUNTER
"Requested Prescriptions   Pending Prescriptions Disp Refills     valACYclovir (VALTREX) 1000 mg tablet 21 tablet 3     Sig: Take 1 tablet (1,000 mg) by mouth 3 times daily       Antivirals for Herpes Protocol Failed - 11/16/2020 10:48 AM        Failed - Normal serum creatinine on file in past 12 months     No lab results found.    Ok to refill medication if creatinine is low          Passed - Patient is age 12 or older        Passed - Recent (12 mo) or future (30 days) visit within the authorizing provider's specialty     Patient has had an office visit with the authorizing provider or a provider within the authorizing providers department within the previous 12 mos or has a future within next 30 days. See \"Patient Info\" tab in inbasket, or \"Choose Columns\" in Meds & Orders section of the refill encounter.              Passed - Medication is active on med list           Last Written Prescription Date:  03/06/2019  Last Fill Quantity: 21,  # refills: 3   Last office visit: 10/19/2020 with prescribing provider:  Johana, Last annual exam 07/20/2020  Future Office Visit:      Prescription approved per [facility] Refill Protocol.  Melody Thakur RN on 11/16/2020 at 12:30 PM      "

## 2021-01-04 DIAGNOSIS — E78.2 MIXED HYPERLIPIDEMIA: ICD-10-CM

## 2021-01-04 RX ORDER — SIMVASTATIN 5 MG
TABLET ORAL
Qty: 90 TABLET | Refills: 0 | Status: SHIPPED | OUTPATIENT
Start: 2021-01-04 | End: 2021-02-18

## 2021-01-04 NOTE — TELEPHONE ENCOUNTER
"Requested Prescriptions   Pending Prescriptions Disp Refills     simvastatin (ZOCOR) 5 MG tablet [Pharmacy Med Name: SIMVASTATIN 5 MG TABLET] 90 tablet 1     Sig: TAKE 1 TABLET BY MOUTH AT BEDTIME       Statins Protocol Passed - 1/4/2021 11:48 AM        Passed - LDL on file in past 12 months     Recent Labs   Lab Test 07/20/20  0920   *             Passed - No abnormal creatine kinase in past 12 months     No lab results found.             Passed - Recent (12 mo) or future (30 days) visit within the authorizing provider's specialty     Patient has had an office visit with the authorizing provider or a provider within the authorizing providers department within the previous 12 mos or has a future within next 30 days. See \"Patient Info\" tab in inbasket, or \"Choose Columns\" in Meds & Orders section of the refill encounter.              Passed - Medication is active on med list        Passed - Patient is age 18 or older        Passed - No active pregnancy on record        Passed - No positive pregnancy test in past 12 months           Last Written Prescription Date:  7/22/20  Last Fill Quantity: 90,  # refills: 1   Last office visit: 10/19/2020 with prescribing provider:  Dr Vaughn   Future Office Visit:  none  Prescription approved per [facility] Refill Protocol.  Lexi Almaraz RN on 1/4/2021 at 12:05 PM          "

## 2021-02-18 DIAGNOSIS — E78.2 MIXED HYPERLIPIDEMIA: ICD-10-CM

## 2021-02-18 RX ORDER — SIMVASTATIN 5 MG
5 TABLET ORAL AT BEDTIME
Qty: 90 TABLET | Refills: 0 | Status: SHIPPED | OUTPATIENT
Start: 2021-02-18 | End: 2021-04-16

## 2021-02-18 NOTE — TELEPHONE ENCOUNTER
"Requested Prescriptions   Pending Prescriptions Disp Refills     simvastatin (ZOCOR) 5 MG tablet 90 tablet 0     Sig: Take 1 tablet (5 mg) by mouth At Bedtime       Statins Protocol Passed - 2/18/2021 11:20 AM        Passed - LDL on file in past 12 months     Recent Labs   Lab Test 07/20/20  0920   *             Passed - No abnormal creatine kinase in past 12 months     No lab results found.             Passed - Recent (12 mo) or future (30 days) visit within the authorizing provider's specialty     Patient has had an office visit with the authorizing provider or a provider within the authorizing providers department within the previous 12 mos or has a future within next 30 days. See \"Patient Info\" tab in inbasket, or \"Choose Columns\" in Meds & Orders section of the refill encounter.              Passed - Medication is active on med list        Passed - Patient is age 18 or older        Passed - No active pregnancy on record        Passed - No positive pregnancy test in past 12 months           Last Written Prescription Date:  1/4/21  Last Fill Quantity: 90,  # refills: 0   Last office visit: 10/19/2020 with prescribing provider:  Johaan   Future Office Visit:  none    Refill approved  Lexi Almaraz RN on 2/18/2021 at 11:39 AM        "

## 2021-04-16 ENCOUNTER — OFFICE VISIT (OUTPATIENT)
Dept: FAMILY MEDICINE | Facility: CLINIC | Age: 56
End: 2021-04-16
Payer: COMMERCIAL

## 2021-04-16 VITALS
DIASTOLIC BLOOD PRESSURE: 88 MMHG | HEART RATE: 76 BPM | SYSTOLIC BLOOD PRESSURE: 120 MMHG | WEIGHT: 146 LBS | BODY MASS INDEX: 24.32 KG/M2 | RESPIRATION RATE: 16 BRPM | HEIGHT: 65 IN | OXYGEN SATURATION: 100 % | TEMPERATURE: 97.7 F

## 2021-04-16 DIAGNOSIS — Z11.4 SCREENING FOR HIV (HUMAN IMMUNODEFICIENCY VIRUS): ICD-10-CM

## 2021-04-16 DIAGNOSIS — R60.0 LOCALIZED EDEMA: ICD-10-CM

## 2021-04-16 DIAGNOSIS — Z00.01 ENCOUNTER FOR ROUTINE ADULT MEDICAL EXAM WITH ABNORMAL FINDINGS: Primary | ICD-10-CM

## 2021-04-16 DIAGNOSIS — Z13.21 ENCOUNTER FOR VITAMIN DEFICIENCY SCREENING: ICD-10-CM

## 2021-04-16 DIAGNOSIS — Z12.11 SCREEN FOR COLON CANCER: ICD-10-CM

## 2021-04-16 DIAGNOSIS — N80.109 ENDOMETRIOSIS OF OVARY: ICD-10-CM

## 2021-04-16 DIAGNOSIS — E78.5 HYPERLIPIDEMIA LDL GOAL <130: Chronic | ICD-10-CM

## 2021-04-16 DIAGNOSIS — Z11.59 NEED FOR HEPATITIS C SCREENING TEST: ICD-10-CM

## 2021-04-16 DIAGNOSIS — D27.0 BENIGN NEOPLASM OF RIGHT OVARY: ICD-10-CM

## 2021-04-16 PROCEDURE — 99213 OFFICE O/P EST LOW 20 MIN: CPT | Mod: 25 | Performed by: FAMILY MEDICINE

## 2021-04-16 PROCEDURE — 99386 PREV VISIT NEW AGE 40-64: CPT | Performed by: FAMILY MEDICINE

## 2021-04-16 RX ORDER — SIMVASTATIN 5 MG
5 TABLET ORAL AT BEDTIME
Qty: 90 TABLET | Refills: 3 | Status: SHIPPED | OUTPATIENT
Start: 2021-04-16 | End: 2022-03-15

## 2021-04-16 ASSESSMENT — MIFFLIN-ST. JEOR: SCORE: 1253.13

## 2021-04-16 NOTE — PATIENT INSTRUCTIONS
[facility]  [address] [place] [id number]  Office: [phone]   Fax:    [phone]       Recommend calcium 1200mg daily and  vitamin D 1000iu daily in the summer and 2000iu in the fall, winter and spring, and daily exercise with some resistance training to help keep your bones strong. Recent research has shown that daily or every other day weight bearing exercise with resistance training is especially important in maintaining and increasing bone density and preventing osteoporosis.      To help lower your cholesterol and triglycerides:      Exercise for 30-45 minutes 4 days/week and   Try odorless fish oil or flax seed oil capsules - 3000-4000mg by   mouth daily, and try 2 or 3 of the below supplements:   an odor-free garlic supplement daily,  coenzyme q-10 @ 100mg by   mouth daily, red yeast rice extract 1200mg by mouth twice daily and   plant sterols such as Benecol : Three servings per day (1.5 g sitostanol   per 1 1/2 teaspoon [8 g] serving) or  Minute Maid  Heart [place]  orange   juice or  Rice Dream Heart [place]  beverages: Two 8 fluid ounce   servings daily.     Can also do a daily fiber therapy with citrucel or rodney fiber   (available at [facility]) to help reduce triglycerides and overall cholesterol.    Decreased to 1/2 or even 1/4 over the counter usual dose, if getting diarrhea/gas/bloating.    Or for  daily antioxidant/fiber therapy instead of citrucel or rodney fiber :   drink guy fresca - 1 scoop of guy seeds (one of natures superfoods -   available at [facility]) in 8 oz water with juice of 1/4-1/2 of a lime and    A little sweetener ( sugar) or Agave syrup or honey to taste. It makes   a slurry w/ the seeds. You chew the seeds a bit as you drink it .   Tastes a bit like a sweet -tart.      In addition for your general health, try  vitamin D 1000 IU daily,    folic acid 1mg daily and calcium citrate or carbonate 600mg by mouth   twice daily.  If you get constipation, try  taking your calcium with a   magnesium supplement.  Also should be taking multivitamin daily such   as centrum - 1/2 tablet twice  daily or 1 flintstones vitamin twice daily.     I also recommend a yogurt with active cultures daily or a probiotic   supplement 1-2 capsules daily (depending on formulation) daily for   good digestive health and immune function.     Look at your inflammatory foods:avoid or eliminate  processed flours, processed sugars, saturated fats.  Sometimes dairy and gluten  can increase inflammation for some people.         For anti-inflammatory foods: Try to stick with dark green leafy veggies, organic foods, beets, walnuts, green tea- especially matcha green tea, salmon, pomegranates, olive oil, fish oil, dark red tart cherries.   Also supplements with turmeric (look for one with black pepper in it also - can also find in combination with anam and curcumin - other natural anti-inflammatories)  can help as well.   Can find turmeric supplements in powder, liquid or capsules.  Ritchie Milk combines all the above - turmeric, black pepper, anam, and curcumin) - which you can mix with hot liquid or a nut based milk.          Thank you so much or choosing [facility]  for your Health Care. It was a pleasure seeing you at your visit today! Please contact us with any questions or concerns you may have.                   Steph Gutierrez MD                              To reach your [facility] care team after hours call:   [phone]    PLEASE NOTE OUR HOURS HAVE CHANGED secondary to COVID-19 coronavirus pandemic, as we are trying to minimize patient exposure to the virus,  which is now widespread in the [facility].  These hours may change with very little notice.  We apologize for any inconvenience.       Our current clinic hours are:          Monday- Thursday   7:00am - 6:00pm  in person.      Friday  7:00am- 5:00pm                        Saturday and Sunday : Closed to in person and virtual visits        We have telephone and virtual visit times available between    7:00am - 6pm on Monday-Friday as well.                                                Phone:  [phone]      Our pharmacy hours: Monday through Friday 9:00am to 5:00pm                        Saturday - 9:00 am to 12 noon       Sunday : Closed.              Phone:  [phone]              ###  Please note: at this time we are not accepting any walk-in visits. ###      There is also information available at our web site:  [email or web address]    If your provider ordered any lab tests and you do not receive the results within 10 business days, please call the clinic.    If you need a medication refill please contact your pharmacy.  Please allow 2 business days for your refill to be completed.    Our clinic offers telephone visits and e visits.  Please ask one of your team members to explain more.      Use [employer] (secure email communication and access to your chart) to send your primary care provider a message or make an appointment. Ask someone on your Team how to sign up for [employer].

## 2021-04-29 DIAGNOSIS — Z11.4 SCREENING FOR HIV (HUMAN IMMUNODEFICIENCY VIRUS): ICD-10-CM

## 2021-04-29 DIAGNOSIS — Z11.59 NEED FOR HEPATITIS C SCREENING TEST: ICD-10-CM

## 2021-04-29 DIAGNOSIS — R60.0 LOCALIZED EDEMA: ICD-10-CM

## 2021-04-29 DIAGNOSIS — E78.5 HYPERLIPIDEMIA LDL GOAL <130: ICD-10-CM

## 2021-04-29 DIAGNOSIS — Z13.21 ENCOUNTER FOR VITAMIN DEFICIENCY SCREENING: ICD-10-CM

## 2021-04-29 LAB
ALBUMIN SERPL-MCNC: 3.9 G/DL (ref 3.4–5)
ALP SERPL-CCNC: 53 U/L (ref 40–150)
ALT SERPL W P-5'-P-CCNC: 29 U/L (ref 0–50)
ANION GAP SERPL CALCULATED.3IONS-SCNC: 3 MMOL/L (ref 3–14)
AST SERPL W P-5'-P-CCNC: 25 U/L (ref 0–45)
BILIRUB SERPL-MCNC: 1 MG/DL (ref 0.2–1.3)
BUN SERPL-MCNC: 16 MG/DL (ref 7–30)
CALCIUM SERPL-MCNC: 9 MG/DL (ref 8.5–10.1)
CHLORIDE SERPL-SCNC: 110 MMOL/L (ref 94–109)
CHOLEST SERPL-MCNC: 191 MG/DL
CO2 SERPL-SCNC: 30 MMOL/L (ref 20–32)
CREAT SERPL-MCNC: 0.78 MG/DL (ref 0.52–1.04)
CREAT UR-MCNC: 83 MG/DL
ERYTHROCYTE [DISTWIDTH] IN BLOOD BY AUTOMATED COUNT: 13 % (ref 10–15)
GFR SERPL CREATININE-BSD FRML MDRD: 85 ML/MIN/{1.73_M2}
GLUCOSE SERPL-MCNC: 82 MG/DL (ref 70–99)
HCT VFR BLD AUTO: 38.7 % (ref 35–47)
HCV AB SERPL QL IA: NONREACTIVE
HDLC SERPL-MCNC: 58 MG/DL
HGB BLD-MCNC: 13 G/DL (ref 11.7–15.7)
HIV 1+2 AB+HIV1 P24 AG SERPL QL IA: NONREACTIVE
LDLC SERPL CALC-MCNC: 105 MG/DL
MCH RBC QN AUTO: 31.6 PG (ref 26.5–33)
MCHC RBC AUTO-ENTMCNC: 33.6 G/DL (ref 31.5–36.5)
MCV RBC AUTO: 94 FL (ref 78–100)
MICROALBUMIN UR-MCNC: 9 MG/L
MICROALBUMIN/CREAT UR: 11.02 MG/G CR (ref 0–25)
NONHDLC SERPL-MCNC: 133 MG/DL
PLATELET # BLD AUTO: 249 10E9/L (ref 150–450)
POTASSIUM SERPL-SCNC: 4.4 MMOL/L (ref 3.4–5.3)
PROT SERPL-MCNC: 6.9 G/DL (ref 6.8–8.8)
RBC # BLD AUTO: 4.11 10E12/L (ref 3.8–5.2)
SODIUM SERPL-SCNC: 143 MMOL/L (ref 133–144)
TRIGL SERPL-MCNC: 142 MG/DL
TSH SERPL DL<=0.005 MIU/L-ACNC: 2.96 MU/L (ref 0.4–4)
WBC # BLD AUTO: 5.8 10E9/L (ref 4–11)

## 2021-04-29 PROCEDURE — 86803 HEPATITIS C AB TEST: CPT | Performed by: FAMILY MEDICINE

## 2021-04-29 PROCEDURE — 82043 UR ALBUMIN QUANTITATIVE: CPT | Performed by: FAMILY MEDICINE

## 2021-04-29 PROCEDURE — 80053 COMPREHEN METABOLIC PANEL: CPT | Performed by: FAMILY MEDICINE

## 2021-04-29 PROCEDURE — 36415 COLL VENOUS BLD VENIPUNCTURE: CPT | Performed by: FAMILY MEDICINE

## 2021-04-29 PROCEDURE — 85027 COMPLETE CBC AUTOMATED: CPT | Performed by: FAMILY MEDICINE

## 2021-04-29 PROCEDURE — 80061 LIPID PANEL: CPT | Performed by: FAMILY MEDICINE

## 2021-04-29 PROCEDURE — 84443 ASSAY THYROID STIM HORMONE: CPT | Performed by: FAMILY MEDICINE

## 2021-04-29 PROCEDURE — 82306 VITAMIN D 25 HYDROXY: CPT | Performed by: FAMILY MEDICINE

## 2021-04-29 PROCEDURE — 87389 HIV-1 AG W/HIV-1&-2 AB AG IA: CPT | Performed by: FAMILY MEDICINE

## 2021-04-30 LAB
DEPRECATED CALCIDIOL+CALCIFEROL SERPL-MC: <44 UG/L (ref 20–75)
VITAMIN D2 SERPL-MCNC: <5 UG/L
VITAMIN D3 SERPL-MCNC: 39 UG/L

## 2021-05-12 PROBLEM — E78.5 HYPERLIPIDEMIA LDL GOAL <130: Status: ACTIVE | Noted: 2021-05-12

## 2021-05-12 PROBLEM — E78.2 MIXED HYPERLIPIDEMIA: Status: RESOLVED | Noted: 2021-04-16 | Resolved: 2021-05-12

## 2021-05-12 PROBLEM — E78.2 MIXED HYPERLIPIDEMIA: Status: ACTIVE | Noted: 2021-04-16

## 2021-07-07 DIAGNOSIS — N95.1 SYMPTOMS, SUCH AS FLUSHING, SLEEPLESSNESS, HEADACHE, LACK OF CONCENTRATION, ASSOCIATED WITH THE MENOPAUSE: ICD-10-CM

## 2021-07-07 RX ORDER — ESTRADIOL 1 MG/1
TABLET ORAL
Qty: 30 TABLET | Refills: 0 | Status: SHIPPED | OUTPATIENT
Start: 2021-07-07 | End: 2021-08-02

## 2021-07-07 NOTE — TELEPHONE ENCOUNTER
"Requested Prescriptions   Pending Prescriptions Disp Refills     estradiol (ESTRACE) 1 MG tablet [Pharmacy Med Name: ESTRADIOL 1 MG TABLET] 90 tablet 3     Sig: TAKE 1 TABLET BY MOUTH EVERY DAY       Hormone Replacement Therapy Passed - 7/7/2021 12:33 AM        Passed - Blood pressure under 140/90 in past 12 months     BP Readings from Last 3 Encounters:   04/16/21 120/88   10/19/20 118/78   07/20/20 112/60                 Passed - Recent (12 mo) or future (30 days) visit within the authorizing provider's specialty     Patient has had an office visit with the authorizing provider or a provider within the authorizing providers department within the previous 12 mos or has a future within next 30 days. See \"Patient Info\" tab in inbasket, or \"Choose Columns\" in Meds & Orders section of the refill encounter.              Passed - Patient has mammogram in past 2 years on file if age 50-75        Passed - Medication is active on med list        Passed - Patient is 18 years of age or older        Passed - No active pregnancy on record        Passed - No positive pregnancy test on record in past 12 months           Refilled for one month  Appointment needed for further refills  Lexi Almaraz RN on 7/7/2021 at 9:32 AM    "

## 2021-08-02 DIAGNOSIS — N95.1 SYMPTOMS, SUCH AS FLUSHING, SLEEPLESSNESS, HEADACHE, LACK OF CONCENTRATION, ASSOCIATED WITH THE MENOPAUSE: ICD-10-CM

## 2021-08-02 RX ORDER — ESTRADIOL 1 MG/1
TABLET ORAL
Qty: 30 TABLET | Refills: 0 | Status: SHIPPED | OUTPATIENT
Start: 2021-08-02 | End: 2021-08-26

## 2021-08-02 NOTE — TELEPHONE ENCOUNTER
"Requested Prescriptions   Pending Prescriptions Disp Refills     estradiol (ESTRACE) 1 MG tablet [Pharmacy Med Name: ESTRADIOL 1 MG TABLET] 30 tablet 0     Sig: TAKE 1 TABLET BY MOUTH EVERY DAY       Hormone Replacement Therapy Passed - 8/2/2021 11:01 AM        Passed - Blood pressure under 140/90 in past 12 months     BP Readings from Last 3 Encounters:   04/16/21 120/88   10/19/20 118/78   07/20/20 112/60                 Passed - Recent (12 mo) or future (30 days) visit within the authorizing provider's specialty     Patient has had an office visit with the authorizing provider or a provider within the authorizing providers department within the previous 12 mos or has a future within next 30 days. See \"Patient Info\" tab in inbasket, or \"Choose Columns\" in Meds & Orders section of the refill encounter.              Passed - Patient has mammogram in past 2 years on file if age 50-75        Passed - Medication is active on med list        Passed - Patient is 18 years of age or older        Passed - No active pregnancy on record        Passed - No positive pregnancy test on record in past 12 months           Prescription approved per [facility] Refill Protocol.  Annual exam due  Lexi Almaraz RN on 8/2/2021 at 11:16 AM      "

## 2021-08-09 ENCOUNTER — MYC MEDICAL ADVICE (OUTPATIENT)
Dept: FAMILY MEDICINE | Facility: CLINIC | Age: 56
End: 2021-08-09

## 2021-08-26 DIAGNOSIS — N95.1 SYMPTOMS, SUCH AS FLUSHING, SLEEPLESSNESS, HEADACHE, LACK OF CONCENTRATION, ASSOCIATED WITH THE MENOPAUSE: ICD-10-CM

## 2021-08-26 RX ORDER — ESTRADIOL 1 MG/1
TABLET ORAL
Qty: 30 TABLET | Refills: 0 | Status: SHIPPED | OUTPATIENT
Start: 2021-08-26 | End: 2021-11-05

## 2021-08-26 NOTE — TELEPHONE ENCOUNTER
"Requested Prescriptions   Pending Prescriptions Disp Refills     estradiol (ESTRACE) 1 MG tablet [Pharmacy Med Name: ESTRADIOL 1 MG TABLET] 30 tablet 0     Sig: TAKE 1 TABLET BY MOUTH EVERY DAY       Hormone Replacement Therapy Passed - 8/26/2021  1:19 AM        Passed - Blood pressure under 140/90 in past 12 months     BP Readings from Last 3 Encounters:   04/16/21 120/88   10/19/20 118/78   07/20/20 112/60                 Passed - Recent (12 mo) or future (30 days) visit within the authorizing provider's specialty     Patient has had an office visit with the authorizing provider or a provider within the authorizing providers department within the previous 12 mos or has a future within next 30 days. See \"Patient Info\" tab in inbasket, or \"Choose Columns\" in Meds & Orders section of the refill encounter.              Passed - Patient has mammogram in past 2 years on file if age 50-75        Passed - Medication is active on med list        Passed - Patient is 18 years of age or older        Passed - No active pregnancy on record        Passed - No positive pregnancy test on record in past 12 months           Last Written Prescription Date:  08/02/21  Last Fill Quantity: 30,  # refills: 0   Last office visit: 10/19/2020 with prescribing provider:  Johana   Future Office Visit: none found  Refill approved for one month  Appointment needed for further refills  Lexi Almaraz RN on 8/26/2021 at 9:13 AM          "

## 2021-09-18 ENCOUNTER — HEALTH MAINTENANCE LETTER (OUTPATIENT)
Age: 56
End: 2021-09-18

## 2021-09-19 DIAGNOSIS — N95.1 SYMPTOMS, SUCH AS FLUSHING, SLEEPLESSNESS, HEADACHE, LACK OF CONCENTRATION, ASSOCIATED WITH THE MENOPAUSE: ICD-10-CM

## 2021-09-19 RX ORDER — ESTRADIOL 1 MG/1
TABLET ORAL
Qty: 30 TABLET | Refills: 0 | OUTPATIENT
Start: 2021-09-19

## 2021-09-19 NOTE — TELEPHONE ENCOUNTER
"Requested Prescriptions   Pending Prescriptions Disp Refills     estradiol (ESTRACE) 1 MG tablet [Pharmacy Med Name: ESTRADIOL 1 MG TABLET] 30 tablet 0     Sig: TAKE 1 TABLET BY MOUTH EVERY DAY       Hormone Replacement Therapy Passed - 9/19/2021  7:33 AM        Passed - Blood pressure under 140/90 in past 12 months     BP Readings from Last 3 Encounters:   04/16/21 120/88   10/19/20 118/78   07/20/20 112/60                 Passed - Recent (12 mo) or future (30 days) visit within the authorizing provider's specialty     Patient has had an office visit with the authorizing provider or a provider within the authorizing providers department within the previous 12 mos or has a future within next 30 days. See \"Patient Info\" tab in inbasket, or \"Choose Columns\" in Meds & Orders section of the refill encounter.              Passed - Patient has mammogram in past 2 years on file if age 50-75        Passed - Medication is active on med list        Passed - Patient is 18 years of age or older        Passed - No active pregnancy on record        Passed - No positive pregnancy test on record in past 12 months           Last Written Prescription Date:  8/26/21  Last Fill Quantity: 30,  # refills: 0   Last office visit: 10/19/2020 with prescribing provider:  Dr Vaughn   Future Office Visit:      Annual was due 7/20/21  Pt due for annual, no appt scheduled. Pt already received one month extension. Rx denied.   Coby Parson RN on 9/19/2021 at 5:23 PM        "

## 2021-09-23 ENCOUNTER — MYC MEDICAL ADVICE (OUTPATIENT)
Dept: FAMILY MEDICINE | Facility: CLINIC | Age: 56
End: 2021-09-23

## 2021-09-23 DIAGNOSIS — Z12.31 VISIT FOR SCREENING MAMMOGRAM: Primary | ICD-10-CM

## 2021-09-23 DIAGNOSIS — E78.5 HYPERLIPIDEMIA LDL GOAL <130: ICD-10-CM

## 2021-09-27 NOTE — TELEPHONE ENCOUNTER
Please review and advise on my chart message below    Routing refill request to provider for review/approval because:  Medication is reported/historical    Gabrielle RED RN, BSN  [facility]

## 2021-09-29 NOTE — TELEPHONE ENCOUNTER
Routed to [facility] to schedule appointment for breast and pelvic exam.    Blueboxt message sent.    Gabrielle RED RN, BSN  -[facility]

## 2021-09-29 NOTE — TELEPHONE ENCOUNTER
Pt overdue for mammogram.   Didn't have annual gyn exam with Dr. Vaughn this year as yet.  I saw her for annual physical 4/16/2021, but didn't do a breast or pelvic exam as she was going to have them done with Dr. Vaughn, but apparently hasn't had that as yet and will not prior to end of year.      Please have pt schedule a 20 minute annual gyn exam with me in the next month or so. Ok to take a virtual visit spot.  Needs a physician breast exam.     Recent Labs   Lab Test 04/29/21  0748 07/20/20  0920   CHOL 191 276*   HDL 58 57   * 194*   TRIG 142 127      also needs a fasting lab only visit for lipids , ast, alt, ck levels in mid 10/2021.  Future orders placed in [facility] for labs. I placed her mammogram orders .

## 2021-09-29 NOTE — TELEPHONE ENCOUNTER
Patient has appt with Dr Vaughn in [facility], she will call and ask for refill from him until she is seen for her  Annual exam with him.       Nissa Quintero

## 2021-11-05 ENCOUNTER — OFFICE VISIT (OUTPATIENT)
Dept: OBGYN | Facility: CLINIC | Age: 56
End: 2021-11-05
Payer: COMMERCIAL

## 2021-11-05 ENCOUNTER — ANCILLARY PROCEDURE (OUTPATIENT)
Dept: MAMMOGRAPHY | Facility: CLINIC | Age: 56
End: 2021-11-05
Payer: COMMERCIAL

## 2021-11-05 VITALS
HEIGHT: 65 IN | DIASTOLIC BLOOD PRESSURE: 64 MMHG | WEIGHT: 146 LBS | SYSTOLIC BLOOD PRESSURE: 102 MMHG | BODY MASS INDEX: 24.32 KG/M2

## 2021-11-05 DIAGNOSIS — Z00.00 ANNUAL PHYSICAL EXAM: Primary | ICD-10-CM

## 2021-11-05 DIAGNOSIS — Z12.31 VISIT FOR SCREENING MAMMOGRAM: ICD-10-CM

## 2021-11-05 DIAGNOSIS — B02.8 HERPES ZOSTER WITH COMPLICATION: ICD-10-CM

## 2021-11-05 DIAGNOSIS — N95.1 SYMPTOMS, SUCH AS FLUSHING, SLEEPLESSNESS, HEADACHE, LACK OF CONCENTRATION, ASSOCIATED WITH THE MENOPAUSE: ICD-10-CM

## 2021-11-05 PROCEDURE — 77067 SCR MAMMO BI INCL CAD: CPT | Mod: TC | Performed by: RADIOLOGY

## 2021-11-05 PROCEDURE — 99396 PREV VISIT EST AGE 40-64: CPT | Performed by: OBSTETRICS & GYNECOLOGY

## 2021-11-05 PROCEDURE — 77063 BREAST TOMOSYNTHESIS BI: CPT | Mod: TC | Performed by: RADIOLOGY

## 2021-11-05 RX ORDER — ESTRADIOL 1 MG/1
1 TABLET ORAL DAILY
Qty: 90 TABLET | Refills: 3 | Status: SHIPPED | OUTPATIENT
Start: 2021-11-05 | End: 2022-10-03

## 2021-11-05 RX ORDER — VALACYCLOVIR HYDROCHLORIDE 1 G/1
1000 TABLET, FILM COATED ORAL 3 TIMES DAILY
Qty: 21 TABLET | Refills: 3 | Status: SHIPPED | OUTPATIENT
Start: 2021-11-05 | End: 2023-09-29

## 2021-11-05 ASSESSMENT — ANXIETY QUESTIONNAIRES
IF YOU CHECKED OFF ANY PROBLEMS ON THIS QUESTIONNAIRE, HOW DIFFICULT HAVE THESE PROBLEMS MADE IT FOR YOU TO DO YOUR WORK, TAKE CARE OF THINGS AT HOME, OR GET ALONG WITH OTHER PEOPLE: NOT DIFFICULT AT ALL
6. BECOMING EASILY ANNOYED OR IRRITABLE: SEVERAL DAYS
GAD7 TOTAL SCORE: 3
7. FEELING AFRAID AS IF SOMETHING AWFUL MIGHT HAPPEN: NOT AT ALL
3. WORRYING TOO MUCH ABOUT DIFFERENT THINGS: NOT AT ALL
5. BEING SO RESTLESS THAT IT IS HARD TO SIT STILL: NOT AT ALL
1. FEELING NERVOUS, ANXIOUS, OR ON EDGE: SEVERAL DAYS
2. NOT BEING ABLE TO STOP OR CONTROL WORRYING: SEVERAL DAYS

## 2021-11-05 ASSESSMENT — PATIENT HEALTH QUESTIONNAIRE - PHQ9
5. POOR APPETITE OR OVEREATING: NOT AT ALL
SUM OF ALL RESPONSES TO PHQ QUESTIONS 1-9: 2

## 2021-11-05 ASSESSMENT — MIFFLIN-ST. JEOR: SCORE: 1253.13

## 2021-11-05 NOTE — PROGRESS NOTES
Coby is a 56 year old  female who presents for annual exam.     Besides routine health maintenance, she would like to discuss hormone replacement therapy, which she started after her hysterectomy. She knows it helps especially with her sleep issues. She does want to continue, but discuss other formulations.    HPI:      Mammogram was done at 2:15.  The patient's PCP is Dr Steph Gutierrez MD.   She does the fasting labs.    She has stopped with the hot flashes.  Her sleep is better as well.      GYNECOLOGIC HISTORY:    Patient's last menstrual period was 2008.  She  reports that she has never smoked. She has never used smokeless tobacco.  Patient is sexually active.  STD testing offered?  Declined     Last PHQ-9 score on record =   PHQ-9 SCORE 2021   PHQ-9 Total Score 2     Last GAD7 score on record =   ANA-7 SCORE 2021   Total Score 3     Alcohol Score = 2    HEALTH MAINTENANCE:  Cholesterol: followed by PCP  Recent Labs   Lab Test 21  0748 20  0920   CHOL 191 276*   HDL 58 57   * 194*   TRIG 142 127     TSH   Date Value Ref Range Status   2021 2.96 0.40 - 4.00 mU/L Final     Last Mammo: 20, Result: Normal, Next Mammo: Today   Pap:   Lab Results   Component Value Date    PAP NIL, NEG-HPV 2020    PAP NIL, NEG-HPV 2019    PAP NIL 2017   Colonoscopy:  Does FIT testing,   Dexa:  3/6/19  Health maintenance updated:  yes    HISTORY:  OB History    Para Term  AB Living   1 1 1 0 0 1   SAB TAB Ectopic Multiple Live Births   0 0 0 0 1      # Outcome Date GA Lbr Benny/2nd Weight Sex Delivery Anes PTL Lv   1 Term 00 40w0d  3.033 kg (6 lb 11 oz) F    LATHA      Birth Comments: pre-eclampsia       Name: Quentin       Obstetric Comments   Daughter - Patricia - is 20 yrs old - noted 2021 - no hx of gestational diabetes, but hx of pre-eclampsia        Patient Active Problem List   Diagnosis     Female infertility     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 Vaughn -ob/gyn      Other chronic serous otitis media     EDEMA - right lower extremity - p dermoid teratoma     Uterine leiomyoma     CARDIOVASCULAR SCREENING; LDL GOAL LESS THAN 160     Screening for cervical cancer     Hyperlipidemia LDL goal <130     Past Surgical History:   Procedure Laterality Date     HYSTERECTOMY, PAP STILL INDICATED  2009    lap. supracervical hyst with left ovary left in place      SURGICAL HISTORY OF -   at age 26    removal of right ovarian teratoma - atrophic right ovary still in place      SURGICAL HISTORY OF -   at age 13     strabismus surgery on both eyes      SURGICAL HISTORY OF -       endometriosis surgery - with some laser - laparoscopic       Social History     Tobacco Use     Smoking status: Never Smoker     Smokeless tobacco: Never Used   Substance Use Topics     Alcohol use: Yes     Comment: 2 to 3 drinks a year.      Problem (# of Occurrences) Relation (Name,Age of Onset)    Breast Cancer (1) Father: did genetic counseling = negative.     Cancer (1) Father (80): stage IV sarcoma of left triceps at age 80  - removed at [facility]- 2008    Cardiac Sudden Death (1) Maternal Grandmother (52): massive MI at age 52     Cerebrovascular Disease (3) Mother (80): first one at age 80, then 2 small ones after that , Maternal Grandfather, Paternal Grandfather    Diabetes (1) Maternal Grandfather    Heart Disease (1) Mother    Hypertension (2) Mother, Father    Lipids (2) Mother, Sister ([place])    Lung Cancer (1) Father    Myocardial Infarction (1) Paternal Grandmother (88):  from first MI while playing cards     No Known Problems (3) Sister, Brother, Other    Thyroid Disease (1) Mother            Current Outpatient Medications   Medication Sig     cetirizine (ZYRTEC) 10 MG tablet Take 10 mg by mouth daily     estradiol (ESTRACE) 1 MG tablet Take 1 tablet (1 mg) by mouth daily  "    Multiple Vitamin (MULTI-VITAMIN) per tablet Take 1 tablet by mouth daily.     simvastatin (ZOCOR) 5 MG tablet Take 1 tablet (5 mg) by mouth At Bedtime     Turmeric 500 MG CAPS      valACYclovir (VALTREX) 1000 mg tablet Take 1 tablet (1,000 mg) by mouth 3 times daily     No current facility-administered medications for this visit.     No Known Allergies    Past medical, surgical, social and family histories were reviewed and updated in EPIC.    ROS:   12 point review of systems negative other than symptoms noted below or in the HPI.  No urinary frequency or dysuria, bladder or kidney problems    EXAM:  /64   Ht 1.651 m (5' 5\")   Wt 66.2 kg (146 lb)   LMP 03/19/2008   BMI 24.30 kg/m     BMI: Body mass index is 24.3 kg/m .    PHYSICAL EXAM:  Constitutional:   Appearance: Well nourished, well developed, alert, in no acute distress    Chest:  Respiratory Effort:  Breathing unlabored  Cardiovascular:    Heart: Auscultation:  Regular rate, normal rhythm, no murmurs present  Breasts: Inspection of Breasts:  No lymphadenopathy present., Palpation of Breasts and Axillae:  No masses present on palpation, no breast tenderness., Axillary Lymph Nodes:  No lymphadenopathy present. and No nodularity, asymmetry or nipple discharge bilaterally.    Lymphatic: Lymph Nodes:  No other lymphadenopathy present  Skin:  General Inspection:  No rashes present, no lesions present, no areas of  discoloration  Neurologic:    Mental Status:  Oriented X3.  Normal strength and tone, sensory exam                grossly normal, mentation intact and speech normal.    Psychiatric:   Mentation appears normal and affect normal/bright.         Pelvic Exam:  External Genitalia:     Normal appearance for age, no discharge present, no tenderness present, no inflammatory lesions present, color normal  Vagina:     Normal vaginal vault without central or paravaginal defects, no discharge present, no inflammatory lesions present, no masses " present  Bladder:     Nontender to palpation  Urethra:   Urethral Body:  Urethra palpation normal, urethra structural support normal   Urethral Meatus:  No erythema or lesions present  Cervix:     Appearance healthy, no lesions present, nontender to palpation, no bleeding present  Uterus:    Surgically absent  Adnexa:     No adnexal tenderness present, no adnexal masses present  Perineum:     Perineum within normal limits, no evidence of trauma, no rashes or skin lesions present  Anus:     Anus within normal limits, no hemorrhoids present  Inguinal Lymph Nodes:     No lymphadenopathy present  Pubic Hair:     Normal pubic hair distribution for age  Genitalia and Groin:     No rashes present, no lesions present, no areas of discoloration, no masses present      COUNSELING:   Reviewed preventive health counseling, as reflected in patient instructions    BMI: Body mass index is 24.3 kg/m .      ASSESSMENT:  56 year old female with satisfactory annual exam.    ICD-10-CM    1. Annual physical exam  Z00.00    2. Symptoms, such as flushing, sleeplessness, headache, lack of concentration, associated with the menopause  N95.1 estradiol (ESTRACE) 1 MG tablet   3. Herpes zoster with complication  B02.8 valACYclovir (VALTREX) 1000 mg tablet       PLAN:  1. Pap in 2020 was normal, not due for a pap smear at this time  2. Her pcp does her fasting labs  3. Discussed her hormones, will continue the medication.  She can half the medication or go every other day.  4. Will check if she wants to be off the hormones in a year      Angela Arredondo MD

## 2021-11-06 ASSESSMENT — ANXIETY QUESTIONNAIRES: GAD7 TOTAL SCORE: 3

## 2021-11-15 NOTE — RESULT ENCOUNTER NOTE
Your mammogram was normal.     Thank you so much for choosing [facility].  Please contact us with any questions that you may have.   We appreciate the opportunity to serve you now and look forward to supporting your healthcare needs for a long time to come!    Most Sincerely,     Steph Gutierrez MD

## 2021-12-18 NOTE — MR AVS SNAPSHOT
After Visit Summary   3/22/2017    Coby Poe    MRN: [id number]           Patient Information     Date Of Birth          1965        Visit Information        Provider Department      3/22/2017 1:30 PM Rick Vaughn MD [facility]        Today's Diagnoses     Encounter for gynecological examination without abnormal finding    -  1    Screen for colon cancer        Encounter for lipid screening for cardiovascular disease        Screening for thyroid disorder        Screening for diabetes mellitus        Herpes simplex infection of genitourinary system        Herpes zoster with complication           Follow-ups after your visit        Future tests that were ordered for you today     Open Future Orders        Priority Expected Expires Ordered    Fecal colorectal cancer screen (FIT) Routine 4/12/2017 6/14/2017 3/22/2017            Who to contact     If you have questions or need follow up information about today's clinic visit or your schedule please contact [facility] directly at [phone].  Normal or non-critical lab and imaging results will be communicated to you by MyChart, letter or phone within 4 business days after the clinic has received the results. If you do not hear from us within 7 days, please contact the clinic through [employer] or phone. If you have a critical or abnormal lab result, we will notify you by phone as soon as possible.  Submit refill requests through [employer] or call your pharmacy and they will forward the refill request to us. Please allow 3 business days for your refill to be completed.          Additional Information About Your Visit        MyChart Information     [employer] gives you secure access to your electronic health record. If you see a primary care provider, you can also send messages to your care team and make appointments. If you have questions, please call your primary care clinic.  If you do not have a primary  "care provider, please call [phone] and they will assist you.        Care EveryWhere ID     This is your Care EveryWhere ID. This could be used by other organizations to access your [facility] medical records  [id number]        Your Vitals Were     Height Last Period Breastfeeding? BMI (Body Mass Index)          5' 5.5\" (1.664 m) 03/19/2008 No 24.09 kg/m2         Blood Pressure from Last 3 Encounters:   03/22/17 122/86   12/06/12 102/72   03/14/12 94/72    Weight from Last 3 Encounters:   03/22/17 147 lb (66.7 kg)   12/06/12 142 lb (64.4 kg)   03/14/12 141 lb 8 oz (64.2 kg)              We Performed the Following     Glucose whole blood     Lipid panel reflex to direct LDL     Pap imaged thin layer screen reflex to HPV if ASCUS - recommended age 25 - 29 years     TSH with Free T4 Reflex          Where to get your medicines      These medications were sent to [place] [id number] [place] [facility] - [address]  [address] [place] [id number]     Phone:  [phone]     valACYclovir 1000 mg tablet          Primary Care Provider Office Phone # Fax #    Steph Gutierrez [phone]       [address] [address]        Thank you!     Thank you for choosing [facility]  for your care. Our goal is always to provide you with excellent care. Hearing back from our patients is one way we can continue to improve our services. Please take a few minutes to complete the written survey that you may receive in the mail after your visit with us. Thank you!             Your Updated Medication List - Protect others around you: Learn how to safely use, store and throw away your medicines at www.disposemymeds.org.          This list is accurate as of: 3/22/17  2:47 PM.  Always use your most recent med list.                   Brand Name Dispense Instructions for use    Coenzyme Q10 100 MG Chew      Take  by mouth.       FLUTICASONE " PROPIONATE (NASAL) INHA 50 MCG/DOSE NA     1 month    2 sparys each nostril daily       meclizine 25 MG tablet    ANTIVERT    30 tablet    Take 1 tablet by mouth every 6 hours as needed.       Multi-vitamin Tabs tablet   Generic drug:  multivitamin, therapeutic with minerals     100 tablet    Take 1 tablet by mouth daily.       omega-3 fatty acids 1200 MG capsule     180 capsule    Take 1 capsule by mouth daily.       valACYclovir 1000 mg tablet    VALTREX    21 tablet    Take 1 tablet (1,000 mg) by mouth 3 times daily          Patient

## 2022-06-08 DIAGNOSIS — E78.5 HYPERLIPIDEMIA LDL GOAL <130: Chronic | ICD-10-CM

## 2022-06-13 NOTE — TELEPHONE ENCOUNTER
Routing refill request to provider for review/approval because:  A break in medication and LDL needed    Last filled # 60 3/15/22    Rhiannon De León RN

## 2022-06-15 RX ORDER — SIMVASTATIN 5 MG
TABLET ORAL
Qty: 60 TABLET | Refills: 0 | Status: SHIPPED | OUTPATIENT
Start: 2022-06-15 | End: 2022-06-16

## 2022-06-15 NOTE — TELEPHONE ENCOUNTER
Overdue for follow up.  Please assist pt in making appt(s) for fasting lab only prior to 10/2022.  I will future labs.   Recent Labs   Lab Test 04/29/21  0748 07/20/20  0920   CHOL 191 276*   HDL 58 57   * 194*   TRIG 142 127     Future Appointments 6/15/2022 - 12/12/2022      Date Visit Type Length Department Provider     10/3/2022  8:00 AM LAB 15 min RV LABORATORY RV LAB    Location Instructions:     The clinic is located at [address] [place] [address] in [place].&nbsp; We offer free parking in our on-site lot.              10/3/2022  1:40 PM PREVENTATIVE ADULT            40 min RV FAMILY PRACTICE Steph Gutierrez MD

## 2022-06-16 NOTE — TELEPHONE ENCOUNTER
Kyra - pt needs fasting labs this month or next month - Please assist pt in making appt(s) for fasting lab only appt.  Future orders placed in epic for labs.     Future Appointments 6/16/2022 - 12/13/2022              Date Visit Type Length Department Provider     10/3/2022  8:00 AM LAB 15 min RV LABORATORY RV LAB    Location Instructions:     The clinic is located at [address] [place] [address] in [place].&nbsp; We offer free parking in our on-site lot.              10/3/2022  1:40 PM PREVENTATIVE ADULT            40 min RV FAMILY PRACTICE Steph Gutierrez MD

## 2022-07-12 ENCOUNTER — LAB (OUTPATIENT)
Dept: LAB | Facility: CLINIC | Age: 57
End: 2022-07-12
Payer: COMMERCIAL

## 2022-07-12 DIAGNOSIS — E78.5 HYPERLIPIDEMIA LDL GOAL <130: ICD-10-CM

## 2022-07-12 LAB
ALT SERPL W P-5'-P-CCNC: 18 U/L (ref 0–50)
AST SERPL W P-5'-P-CCNC: 21 U/L (ref 0–45)
CHOLEST SERPL-MCNC: 206 MG/DL
FASTING STATUS PATIENT QL REPORTED: YES
HDLC SERPL-MCNC: 62 MG/DL
LDLC SERPL CALC-MCNC: 118 MG/DL
NONHDLC SERPL-MCNC: 144 MG/DL
TRIGL SERPL-MCNC: 129 MG/DL

## 2022-07-12 PROCEDURE — 80061 LIPID PANEL: CPT

## 2022-07-12 PROCEDURE — 36415 COLL VENOUS BLD VENIPUNCTURE: CPT

## 2022-07-12 PROCEDURE — 84460 ALANINE AMINO (ALT) (SGPT): CPT

## 2022-07-12 PROCEDURE — 84450 TRANSFERASE (AST) (SGOT): CPT

## 2022-09-17 DIAGNOSIS — E78.5 HYPERLIPIDEMIA LDL GOAL <130: Chronic | ICD-10-CM

## 2022-09-19 RX ORDER — SIMVASTATIN 5 MG
TABLET ORAL
Qty: 90 TABLET | Refills: 0 | Status: SHIPPED | OUTPATIENT
Start: 2022-09-19 | End: 2022-10-03

## 2022-09-19 NOTE — TELEPHONE ENCOUNTER
Prescription approved per [facility] Refill Protocol.    Next 5 appointments (look out 90 days)    Oct 03, 2022  1:40 PM  (Arrive by 1:20 PM)  Adult Preventative Visit with Steph Gutierrez MD  [facility] ([facility] ) [address] [place] [id number]  [phone]         Amairani Sim RN  [facility]

## 2022-09-26 ENCOUNTER — TELEPHONE (OUTPATIENT)
Dept: FAMILY MEDICINE | Facility: CLINIC | Age: 57
End: 2022-09-26

## 2022-09-26 ASSESSMENT — ENCOUNTER SYMPTOMS
FREQUENCY: 0
WEAKNESS: 0
EYE PAIN: 0
HEARTBURN: 0
ABDOMINAL PAIN: 0
COUGH: 0
DIARRHEA: 0
DYSURIA: 0
NERVOUS/ANXIOUS: 1
ARTHRALGIAS: 0
CHILLS: 0
MYALGIAS: 0
BREAST MASS: 0
HEADACHES: 0
FEVER: 0
PARESTHESIAS: 0
JOINT SWELLING: 0
HEMATURIA: 0
SORE THROAT: 0
PALPITATIONS: 0
HEMATOCHEZIA: 0
NAUSEA: 0
SHORTNESS OF BREATH: 0
DIZZINESS: 0
CONSTIPATION: 0

## 2022-09-26 NOTE — TELEPHONE ENCOUNTER
Needs of attention regarding:  -Colon Cancer Screening    Health Maintenance Topics with due status: Overdue       Topic Date Due    ZOSTER IMMUNIZATION Never done    DTAP/TDAP/TD IMMUNIZATION 05/21/2020    COLORECTAL CANCER SCREENING 08/26/2021    ANNUAL REVIEW OF HM ORDERS 04/16/2022    INFLUENZA VACCINE 09/01/2022       Communication:  See Letter

## 2022-09-26 NOTE — LETTER
[address] [facility] [id number] [phone]                    September 26, 2022    Coby Poe  [address]  [facility] [address]    Dear Coby,    We care about your health and have reviewed your health plan and are making recommendations based on this review, to optimize your health.    You are in particular need of attention regarding:  -Colon Cancer Screening    We are recommending that you:    -schedule a COLONOSCOPY to look for colon cancer (due every 10 years or 5 years in higher risk situations.)       Colon cancer is now the second leading cause of cancer-related deaths in the [facility] for both men and women and there are over 130,000 new cases and 50,000 deaths per year from colon cancer.  Colonoscopies can prevent 90-95% of these deaths.  Problem lesions can be removed before they ever become cancer.  This test is not only looking for cancer, but also getting rid of precancerious lesions.      If you are under/uninsured, we recommend you contact the [employer] program. [employer] is a free colorectal cancer screening program that provides free colonoscopies for eligible under/uninsured [place] men and women. If you are interested in receiving a free colonoscopy, please call [employer] at [phone] (mention code [employer]) to see if you re eligible.    If you do not wish to do a colonoscopy at this time, there is another option. It is called a FIT test or Fecal Immunochemical Occult Blood Test (take home stool sample kit).  It does not replace the colonoscopy for colorectal cancer screening, but it can detect hidden bleeding in the lower colon.  It does need to be repeated every year and if a positive result is obtained, you would be referred for a colonoscopy.      If you have completed either one of these tests at another facility, please call with the details of when and where the tests were done and if they were  normal or not. Or have the records sent to our clinic so that we can best coordinate your care.    In addition, here is a list of due or overdue Health Maintenance reminders.    Health Maintenance Due   Topic Date Due     Zoster (Shingles) Vaccine (1 of 2) Never done     Diptheria Tetanus Pertussis (DTAP/TDAP/TD) Vaccine (2 - Td or Tdap) 05/21/2020     Colorectal Cancer Screening  08/26/2021     ANNUAL REVIEW OF HM ORDERS  04/16/2022     Flu Vaccine (1) 09/01/2022       To address the above recommendations, we encourage you to contact us at [phone], via "[employer]" or by contacting Central Scheduling toll free at [phone] 24 hours a day. They will assist you with finding the most convenient time and location.    Thank you for trusting [facility] and we appreciate the opportunity to serve you.  We look forward to supporting your healthcare needs in the future.    Healthy Regards,    Your [facility] Team

## 2022-10-03 ENCOUNTER — OFFICE VISIT (OUTPATIENT)
Dept: FAMILY MEDICINE | Facility: CLINIC | Age: 57
End: 2022-10-03
Payer: COMMERCIAL

## 2022-10-03 VITALS
HEART RATE: 79 BPM | BODY MASS INDEX: 24.66 KG/M2 | WEIGHT: 148 LBS | HEIGHT: 65 IN | OXYGEN SATURATION: 99 % | SYSTOLIC BLOOD PRESSURE: 108 MMHG | TEMPERATURE: 97.8 F | DIASTOLIC BLOOD PRESSURE: 70 MMHG

## 2022-10-03 DIAGNOSIS — N95.1 SYMPTOMS, SUCH AS FLUSHING, SLEEPLESSNESS, HEADACHE, LACK OF CONCENTRATION, ASSOCIATED WITH THE MENOPAUSE: ICD-10-CM

## 2022-10-03 DIAGNOSIS — Z23 NEED FOR TDAP VACCINATION: ICD-10-CM

## 2022-10-03 DIAGNOSIS — Z12.11 SCREEN FOR COLON CANCER: ICD-10-CM

## 2022-10-03 DIAGNOSIS — E78.5 HYPERLIPIDEMIA LDL GOAL <130: Chronic | ICD-10-CM

## 2022-10-03 DIAGNOSIS — R17 ELEVATED BILIRUBIN: ICD-10-CM

## 2022-10-03 DIAGNOSIS — Z00.00 ROUTINE GENERAL MEDICAL EXAMINATION AT A HEALTH CARE FACILITY: Primary | ICD-10-CM

## 2022-10-03 DIAGNOSIS — Z13.6 CARDIOVASCULAR SCREENING; LDL GOAL LESS THAN 160: ICD-10-CM

## 2022-10-03 DIAGNOSIS — M62.838 TRAPEZIUS MUSCLE SPASM: ICD-10-CM

## 2022-10-03 DIAGNOSIS — Z13.21 ENCOUNTER FOR VITAMIN DEFICIENCY SCREENING: ICD-10-CM

## 2022-10-03 DIAGNOSIS — Z12.31 VISIT FOR SCREENING MAMMOGRAM: ICD-10-CM

## 2022-10-03 LAB
ERYTHROCYTE [DISTWIDTH] IN BLOOD BY AUTOMATED COUNT: 12.8 % (ref 10–15)
HCT VFR BLD AUTO: 38.1 % (ref 35–47)
HGB BLD-MCNC: 12.9 G/DL (ref 11.7–15.7)
MCH RBC QN AUTO: 31.6 PG (ref 26.5–33)
MCHC RBC AUTO-ENTMCNC: 33.9 G/DL (ref 31.5–36.5)
MCV RBC AUTO: 93 FL (ref 78–100)
PLATELET # BLD AUTO: 306 10E3/UL (ref 150–450)
RBC # BLD AUTO: 4.08 10E6/UL (ref 3.8–5.2)
WBC # BLD AUTO: 6.9 10E3/UL (ref 4–11)

## 2022-10-03 PROCEDURE — 80061 LIPID PANEL: CPT | Performed by: FAMILY MEDICINE

## 2022-10-03 PROCEDURE — 90715 TDAP VACCINE 7 YRS/> IM: CPT | Performed by: FAMILY MEDICINE

## 2022-10-03 PROCEDURE — 84443 ASSAY THYROID STIM HORMONE: CPT | Performed by: FAMILY MEDICINE

## 2022-10-03 PROCEDURE — 36415 COLL VENOUS BLD VENIPUNCTURE: CPT | Performed by: FAMILY MEDICINE

## 2022-10-03 PROCEDURE — 99396 PREV VISIT EST AGE 40-64: CPT | Mod: 25 | Performed by: FAMILY MEDICINE

## 2022-10-03 PROCEDURE — 85027 COMPLETE CBC AUTOMATED: CPT | Performed by: FAMILY MEDICINE

## 2022-10-03 PROCEDURE — 99214 OFFICE O/P EST MOD 30 MIN: CPT | Mod: 25 | Performed by: FAMILY MEDICINE

## 2022-10-03 PROCEDURE — 90472 IMMUNIZATION ADMIN EACH ADD: CPT | Performed by: FAMILY MEDICINE

## 2022-10-03 PROCEDURE — 80053 COMPREHEN METABOLIC PANEL: CPT | Performed by: FAMILY MEDICINE

## 2022-10-03 PROCEDURE — 82306 VITAMIN D 25 HYDROXY: CPT | Performed by: FAMILY MEDICINE

## 2022-10-03 PROCEDURE — 90682 RIV4 VACC RECOMBINANT DNA IM: CPT | Performed by: FAMILY MEDICINE

## 2022-10-03 PROCEDURE — 90471 IMMUNIZATION ADMIN: CPT | Performed by: FAMILY MEDICINE

## 2022-10-03 PROCEDURE — 82043 UR ALBUMIN QUANTITATIVE: CPT | Performed by: FAMILY MEDICINE

## 2022-10-03 RX ORDER — ESTRADIOL 1 MG/1
1 TABLET ORAL DAILY
Qty: 90 TABLET | Refills: 3 | Status: SHIPPED | OUTPATIENT
Start: 2022-10-03 | End: 2023-10-04

## 2022-10-03 RX ORDER — CYCLOBENZAPRINE HCL 5 MG
5-10 TABLET ORAL 3 TIMES DAILY PRN
Qty: 21 TABLET | Refills: 1 | Status: SHIPPED | OUTPATIENT
Start: 2022-10-03 | End: 2023-12-20

## 2022-10-03 RX ORDER — SIMVASTATIN 5 MG
5 TABLET ORAL AT BEDTIME
Qty: 90 TABLET | Refills: 3 | Status: SHIPPED | OUTPATIENT
Start: 2022-10-03 | End: 2023-09-29

## 2022-10-03 ASSESSMENT — ENCOUNTER SYMPTOMS
MYALGIAS: 0
PALPITATIONS: 0
HEADACHES: 0
DYSURIA: 0
HEARTBURN: 0
FEVER: 0
ARTHRALGIAS: 0
ABDOMINAL PAIN: 0
WEAKNESS: 0
COUGH: 0
CONSTIPATION: 0
CHILLS: 0
NERVOUS/ANXIOUS: 1
JOINT SWELLING: 0
DIZZINESS: 0
EYE PAIN: 0
BREAST MASS: 0
NAUSEA: 0
PARESTHESIAS: 0
FREQUENCY: 0
HEMATURIA: 0
HEMATOCHEZIA: 0
SORE THROAT: 0
SHORTNESS OF BREATH: 0
DIARRHEA: 0

## 2022-10-03 NOTE — PROGRESS NOTES
SUBJECTIVE:   CC: Coby is an 57 year old who presents for preventive health visit and the following other medical problems:      1. Routine general medical examination at a health care facility    2. Trapezius muscle spasm-happened with pouring sand from 40lbs bag on 9/29/2022     3. Screen for colon cancer    4. Symptoms, such as flushing, sleeplessness, headache, lack of concentration, associated with the menopause    5. Hyperlipidemia LDL goal <130- on simvastatin 5mg nightly since 10/2020     6. Need for Tdap vaccination    7. Visit for screening mammogram    8. CARDIOVASCULAR SCREENING; LDL GOAL LESS THAN 160    9. Encounter for vitamin deficiency screening    10. Elevated bilirubin - 1.6 from labs from 10/3/2022 - needs recheck in the next month 11/2022 - future orders in [facility]             Patient has been advised of split billing requirements and indicates understanding: Yes  Healthy Habits:     Getting at least 3 servings of Calcium per day:  Yes    Bi-annual eye exam:  Yes    Dental care twice a year:  Yes    Sleep apnea or symptoms of sleep apnea:  None    Diet:  Vegetarian/vegan    Frequency of exercise:  4-5 days/week    Duration of exercise:  45-60 minutes    Taking medications regularly:  Yes    PHQ-2 Total Score: 0    Additional concerns today:  No      Pt declines referral for colonoscopy again today, but will do the Cologuard testing - aware if that is positive then goes on to diagnostic colonoscopy with perhaps signif. Increased cost. '    Discussed HRT today.      Health Maintenance Due   Topic Date Due     ZOSTER IMMUNIZATION (1 of 2) Never done     DTAP/TDAP/TD IMMUNIZATION (2 - Td or Tdap) 05/21/2020         Hyperlipidemia Follow-Up - is fasting today.       Are you regularly taking any medication or supplement to lower your cholesterol?   Yes, simvastatin    Are you having muscle aches or other side effects that you think could be caused by your cholesterol lowering medication?  No     Right  trapezius strain/spasm  - No numbness, tingling, or weakness in upper or lower extremities. No bowel or bladder control problems. Having difficulty sleeping.  Tried TENS unit and 220mg naproxen = no help.    Discussed bedtime cyclobenzaprine . No alcohol or other sedating substances while on this.         Today's PHQ-2 Score:   PHQ-2 ( 1999 Pfizer) 9/26/2022   Q1: Little interest or pleasure in doing things 0   Q2: Feeling down, depressed or hopeless 0   PHQ-2 Score 0   PHQ-2 Total Score (12-17 Years)- Positive if 3 or more points; Administer PHQ-A if positive -   Q1: Little interest or pleasure in doing things Not at all   Q2: Feeling down, depressed or hopeless Several days   PHQ-2 Score 1       Abuse: Current or Past (Physical, Sexual or Emotional) - No  Do you feel safe in your environment? Yes        Social History     Tobacco Use     Smoking status: Never Smoker     Smokeless tobacco: Never Used   Substance Use Topics     Alcohol use: Yes     Comment: 2 to 3 drinks a year.         Alcohol Use 9/26/2022   Prescreen: >3 drinks/day or >7 drinks/week? No   Prescreen: >3 drinks/day or >7 drinks/week? -       Reviewed orders with patient.  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 Vaughn -ob/gyn      Other chronic serous otitis media     EDEMA - right lower extremity - p dermoid teratoma     Uterine leiomyoma     CARDIOVASCULAR SCREENING; LDL GOAL LESS THAN 160     Screening for cervical cancer- repeat in 2025 - s/p [facility] with right oophorectomy  in  - left ovary in place       Hyperlipidemia LDL goal <130     Past Surgical History:   Procedure Laterality Date     HYSTERECTOMY, PAP STILL INDICATED  2009    lap. supracervical hyst with left ovary left in place      SURGICAL HISTORY OF -   at age 26    removal of right ovarian teratoma - atrophic right ovary still in place      SURGICAL HISTORY OF -   at age 13     strabismus surgery on both eyes      SURGICAL HISTORY OF -       endometriosis surgery - with some laser - laparoscopic        Social History     Tobacco Use     Smoking status: Never Smoker     Smokeless tobacco: Never Used   Substance Use Topics     Alcohol use: Yes     Comment: 2 to 3 drinks a year.     Family History   Problem Relation Age of Onset     Hypertension Mother      Heart Disease Mother      Lipids Mother      Thyroid Disease Mother      Cerebrovascular Disease Mother 80        first one at age 80, then 2 small ones after that      Hypertension Father      Cancer Father 80        stage IV sarcoma of left triceps at age 80  - removed at [facility]- 2008     Lung Cancer Father      Breast Cancer Father         did genetic counseling = negative.      No Known Problems Sister      Diabetes Maternal Grandfather      Cerebrovascular Disease Maternal Grandfather      Cerebrovascular Disease Paternal Grandfather      Lipids Sister      No Known Problems Brother      Cardiac Sudden Death Maternal Grandmother 52        massive MI at age 52      Myocardial Infarction Paternal Grandmother 88         from first MI while playing cards      No Known Problems Other          Current Outpatient Medications   Medication Sig Dispense Refill     cetirizine (ZYRTEC) 10 MG tablet Take 10 mg by mouth daily       cyclobenzaprine (FLEXERIL) 5 MG tablet Take 1-2 tablets (5-10 mg) by mouth 3 times daily as needed for muscle spasms 21 tablet 1     estradiol (ESTRACE) 1 MG tablet Take 1 tablet (1 mg) by mouth daily 90 tablet 3     Multiple Vitamin  (MULTI-VITAMIN) per tablet Take 1 tablet by mouth daily. 100 tablet 12     simvastatin (ZOCOR) 5 MG tablet Take 1 tablet (5 mg) by mouth At Bedtime 90 tablet 3     valACYclovir (VALTREX) 1000 mg tablet Take 1 tablet (1,000 mg) by mouth 3 times daily 21 tablet 3     Turmeric 500 MG CAPS        No Known Allergies  Recent Labs   Lab Test 07/12/22  0753 04/29/21  0748 07/20/20  0920   * 105* 194*   HDL 62 58 57   TRIG 129 142 127   ALT 18 29  --    CR  --  0.78  --    GFRESTIMATED  --  85  --    GFRESTBLACK  --  >90  --    POTASSIUM  --  4.4  --    TSH  --  2.96 2.07        Breast Cancer Screening:    FHS-7:   Breast CA Risk Assessment (FHS-7) 4/16/2021 11/5/2021 9/26/2022   Did any of your first-degree relatives have breast or ovarian cancer? Yes No Yes   Did any of your relatives have bilateral breast cancer? Unknown No No   Did any man in your family have breast cancer? Yes Yes Yes   Did any woman in your family have breast and ovarian cancer? No No No   Did any woman in your family have breast cancer before age 50 y? No No No   Do you have 2 or more relatives with breast and/or ovarian cancer? No No No   Do you have 2 or more relatives with breast and/or bowel cancer? No No No       Mammogram Screening: Recommended annual mammography  Pertinent mammograms are reviewed under the imaging tab.    History of abnormal Pap smear: NO - age 30-65 PAP every 5 years with negative HPV co-testing recommended  PAP / HPV Latest Ref Rng & Units 7/20/2020 3/6/2019 3/22/2017   PAP (Historical) - NIL NIL NIL   HPV16 NEG:Negative Negative Negative -   HPV18 NEG:Negative Negative Negative -   HRHPV NEG:Negative Negative Negative -     Reviewed and updated as needed this visit by clinical staff                    Reviewed and updated as needed this visit by Provider                   Past Medical History:   Diagnosis Date     Benign neoplasm of ovary at age 23    dermoid teratoma right ovary - s/p removal      Edema     seen at  [facility], since dermoid teratoma right ovary      Endometriosis of ovary      Female infertility of unspecified origin     has had one successful pregnancy      Fibroids 2009    large - resolved s/p supracervical hysterectomy      Routine gynecological examination     has yearly exams with Dr. Vaughn ob/gyn       Past Surgical History:   Procedure Laterality Date     HYSTERECTOMY, PAP STILL INDICATED  2009    lap. supracervical hyst with left ovary left in place      SURGICAL HISTORY OF -   at age 26    removal of right ovarian teratoma - atrophic right ovary still in place      SURGICAL HISTORY OF -   at age 13     strabismus surgery on both eyes      SURGICAL HISTORY OF -       endometriosis surgery - with some laser - laparoscopic      OB History    Para Term  AB Living   1 1 1 0 0 1   SAB IAB Ectopic Multiple Live Births   0 0 0 0 1      # Outcome Date GA Lbr Benny/2nd Weight Sex Delivery Anes PTL Lv   1 Term 00 40w0d  3.033 kg (6 lb 11 oz) F    LATHA      Birth Comments: pre-eclampsia       Name: Quentin       Obstetric Comments   Daughter - Patricia - is 20 yrs old - noted 2021 - no hx of gestational diabetes, but hx of pre-eclampsia        Review of Systems   Constitutional: Negative for chills and fever.   HENT: Negative for congestion, ear pain, hearing loss and sore throat.    Eyes: Negative for pain and visual disturbance.   Respiratory: Negative for cough and shortness of breath.    Cardiovascular: Negative for chest pain, palpitations and peripheral edema.   Gastrointestinal: Negative for abdominal pain, constipation, diarrhea, heartburn, hematochezia and nausea.   Breasts:  Negative for tenderness, breast mass and discharge.   Genitourinary: Negative for dysuria, frequency, genital sores, hematuria, pelvic pain, urgency, vaginal bleeding and vaginal discharge.   Musculoskeletal: Negative for arthralgias, joint swelling and myalgias.   Skin: Negative for rash.   Neurological:  "Negative for dizziness, weakness, headaches and paresthesias.   Psychiatric/Behavioral: Positive for mood changes. The patient is nervous/anxious.           OBJECTIVE:   /70   Pulse 79   Temp 97.8  F (36.6  C)   Ht 1.651 m (5' 5\")   Wt 67.1 kg (148 lb)   LMP 03/19/2008   SpO2 99%   BMI 24.63 kg/m    Physical Exam  GENERAL APPEARANCE: healthy, alert and no distress  EYES: Eyes grossly normal to inspection, PERRL and conjunctivae and sclerae normal  HENT: ear canals and TM's normal, nose and mouth without ulcers or lesions, oropharynx clear and oral mucous membranes moist  NECK: no adenopathy, no asymmetry, masses, or scars and thyroid normal to palpation, tight and tender trapezius muscles noted on palpation.  RESP: lungs clear to auscultation - no rales, rhonchi or wheezes  BREAST: normal without masses, tenderness or nipple discharge and no palpable axillary masses or adenopathy  CV: regular rate and rhythm, normal S1 S2, no S3 or S4, no murmur, click or rub, no peripheral edema and peripheral pulses strong  ABDOMEN: soft, nontender, no hepatosplenomegaly, no masses and bowel sounds normal  MS: no musculoskeletal defects are noted and gait is age appropriate without ataxia  SKIN: no suspicious lesions or rashes  NEURO: Normal strength and tone, sensory exam grossly normal, mentation intact and speech normal  PSYCH: mentation appears normal and affect normal/bright    Diagnostic Test Results:  Labs reviewed in Epic    ASSESSMENT/PLAN:       ICD-10-CM    1. Routine general medical examination at a health care facility  Z00.00       2. Trapezius muscle spasm-happened with pouring sand from 40lbs bag on 9/29/2022   M62.838 cyclobenzaprine (FLEXERIL) 5 MG tablet      3. Screen for colon cancer  Z12.11 COLOGUARD(EXACT SCIENCES)     CANCELED: Fecal colorectal cancer screen FIT - Future (S+30)      4. Symptoms, such as flushing, sleeplessness, headache, lack of concentration, associated with the menopause  " "N95.1 estradiol (ESTRACE) 1 MG tablet      5. Hyperlipidemia LDL goal <130- on simvastatin 5mg nightly since 10/2020   E78.5 simvastatin (ZOCOR) 5 MG tablet     Comprehensive metabolic panel     Lipid panel reflex to direct LDL Fasting     Albumin Random Urine Quantitative with Creat Ratio     Comprehensive metabolic panel     Lipid panel reflex to direct LDL Fasting     Albumin Random Urine Quantitative with Creat Ratio      6. Need for Tdap vaccination  Z23 TDAP VACCINE (Adacel, Boostrix)  [[id number]]      7. Visit for screening mammogram  Z12.31 MA Screening Bilateral w/ Pepe      8. CARDIOVASCULAR SCREENING; LDL GOAL LESS THAN 160  Z13.6 CBC with platelets     Comprehensive metabolic panel     TSH with free T4 reflex     CBC with platelets     Comprehensive metabolic panel     TSH with free T4 reflex      9. Encounter for vitamin deficiency screening  Z13.21 25 Hydroxyvitamin D2 and D3     25 Hydroxyvitamin D2 and D3      10. Elevated bilirubin - 1.6 from labs from 10/3/2022 - needs recheck in the next month 11/2022 - future orders in Epic   R17 HEPATIC PANEL          Patient has been advised of split billing requirements and indicates understanding: Yes    COUNSELING:  Reviewed preventive health counseling, as reflected in patient instructions    Estimated body mass index is 24.3 kg/m  as calculated from the following:    Height as of 11/5/21: 1.651 m (5' 5\").    Weight as of 11/5/21: 66.2 kg (146 lb).      She reports that she has never smoked. She has never used smokeless tobacco.    Heat and massage recommended for trapezius muscle spasm.  Good posture and good sleep positioning discussed as well.     Counseling Resources:  ATP IV Guidelines  Pooled Cohorts Equation Calculator  Breast Cancer Risk Calculator  BRCA-Related Cancer Risk Assessment: FHS-7 Tool  FRAX Risk Assessment  ICSI Preventive Guidelines  Dietary Guidelines for Americans, 2010  USDA's MyPlate  ASA Prophylaxis  Lung CA Screening        "   Steph Gutierrez MD  [facility]    Addendum: Please see result note regarding elevated bilirubin level.  I do not see any history of Gilbert's syndrome and patient's history or problem list.  No evidence of jaundice on exam on date of visit.  We will recheck this in 1 month's time as a lab only visit.--CONSTANTINE

## 2022-10-03 NOTE — PATIENT INSTRUCTIONS
[facility]  [address] [place] [address]  Office: [phone]   Fax:    [phone]       Ok to take 2-220mg naproxen twice daily with food.    Take the cyclobenzaprine at bedtime only - will make you drowsy.     Check with your insurance re: coverage for the new ershingles vaccine - Shingrix. - it is a 2 part shot. 2-6 months apart.        Preventive Health Recommendations  Female Ages 50 - 64    Yearly exam: See your health care provider every year in order to  Review health changes.   Discuss preventive care.    Review your medicines if your doctor has prescribed any.    Get a Pap test every three years (unless you have an abnormal result and your provider advises testing more often).  If you get Pap tests with HPV test, you only need to test every 5 years, unless you have an abnormal result.   You do not need a Pap test if your uterus was removed (hysterectomy) and you have not had cancer.  You should be tested each year for STDs (sexually transmitted diseases) if you're at risk.   Have a mammogram every 1 to 2 years.  Have a colonoscopy at age 50, or have a yearly FIT test (stool test). These exams screen for colon cancer.    Have a cholesterol test every 5 years, or more often if advised.  Have a diabetes test (fasting glucose) every three years. If you are at risk for diabetes, you should have this test more often.   If you are at risk for osteoporosis (brittle bone disease), think about having a bone density scan (DEXA).    Shots: Get a flu shot each year. Get a tetanus shot every 10 years.    Nutrition:   Eat at least 5 servings of fruits and vegetables each day.  Eat whole-grain bread, whole-wheat pasta and brown rice instead of white grains and rice.  Get adequate Calcium and Vitamin D.     Lifestyle  Exercise at least 150 minutes a week (30 minutes a day, 5 days a week). This will help you control your weight and prevent disease.  Limit alcohol to one drink per  day.  No smoking.   Wear sunscreen to prevent skin cancer.   See your dentist every six months for an exam and cleaning.  See your eye doctor every 1 to 2 years.      Thank you so much or choosing [facility]  for your Health Care. It was a pleasure seeing you at your visit today! Please contact us with any questions or concerns you may have.                   Steph Gutierrez MD                              To reach your [facility] care team after hours call:   [phone]    PLEASE NOTE OUR HOURS HAVE CHANGED secondary to COVID-19 coronavirus pandemic, as we are trying to minimize patient exposure to the virus,  which is now widespread in the [facility].  These hours may change with very little notice.  We apologize for any inconvenience.       Our current clinic hours are:          Monday- Thursday   7:00am - 6:00pm  in person.      Friday  7:00am- 5:00pm                       Saturday and Sunday : Closed to in person and virtual visits        We have telephone and virtual visit times available between    7:00am - 6pm on Monday-Friday as well.                                                Phone:  [phone]      Our pharmacy hours: Monday through Friday 8:00am to 5:00pm                        Saturday - 9:00 am to 12 noon       Sunday : Closed.              Phone:  [phone]              ###  Please note: at this time we are not accepting any walk-in visits. ###      There is also information available at our web site:  [email or web address]    If your provider ordered any lab tests and you do not receive the results within 10 business days, please call the clinic.    If you need a medication refill please contact your pharmacy.  Please allow 3 business days for your refill to be completed.    Our clinic offers telephone visits and e visits.  Please ask one of your team members to explain more.      Use [employer] (secure email communication and access to your  chart) to send your primary care provider a message or make an appointment. Ask someone on your Team how to sign up for MyChart.

## 2022-10-04 LAB
ALBUMIN SERPL-MCNC: 4.4 G/DL (ref 3.4–5)
ALP SERPL-CCNC: 57 U/L (ref 40–150)
ALT SERPL W P-5'-P-CCNC: 19 U/L (ref 0–50)
ANION GAP SERPL CALCULATED.3IONS-SCNC: 9 MMOL/L (ref 3–14)
AST SERPL W P-5'-P-CCNC: 22 U/L (ref 0–45)
BILIRUB SERPL-MCNC: 1.6 MG/DL (ref 0.2–1.3)
BUN SERPL-MCNC: 22 MG/DL (ref 7–30)
CALCIUM SERPL-MCNC: 9.6 MG/DL (ref 8.5–10.1)
CHLORIDE BLD-SCNC: 105 MMOL/L (ref 94–109)
CHOLEST SERPL-MCNC: 206 MG/DL
CO2 SERPL-SCNC: 24 MMOL/L (ref 20–32)
CREAT SERPL-MCNC: 0.82 MG/DL (ref 0.52–1.04)
CREAT UR-MCNC: 195 MG/DL
FASTING STATUS PATIENT QL REPORTED: YES
GFR SERPL CREATININE-BSD FRML MDRD: 83 ML/MIN/1.73M2
GLUCOSE BLD-MCNC: 86 MG/DL (ref 70–99)
HDLC SERPL-MCNC: 62 MG/DL
LDLC SERPL CALC-MCNC: 118 MG/DL
MICROALBUMIN UR-MCNC: 31 MG/L
MICROALBUMIN/CREAT UR: 15.9 MG/G CR (ref 0–25)
NONHDLC SERPL-MCNC: 144 MG/DL
POTASSIUM BLD-SCNC: 4.7 MMOL/L (ref 3.4–5.3)
PROT SERPL-MCNC: 7.3 G/DL (ref 6.8–8.8)
SODIUM SERPL-SCNC: 138 MMOL/L (ref 133–144)
TRIGL SERPL-MCNC: 128 MG/DL
TSH SERPL DL<=0.005 MIU/L-ACNC: 1.61 MU/L (ref 0.4–4)

## 2022-10-09 LAB
DEPRECATED CALCIDIOL+CALCIFEROL SERPL-MC: <46 UG/L (ref 20–75)
VITAMIN D2 SERPL-MCNC: <5 UG/L
VITAMIN D3 SERPL-MCNC: 41 UG/L

## 2022-10-16 PROBLEM — M62.838 TRAPEZIUS MUSCLE SPASM: Status: ACTIVE | Noted: 2022-10-16

## 2022-10-16 PROBLEM — R17 ELEVATED BILIRUBIN: Status: ACTIVE | Noted: 2022-10-16

## 2022-10-16 PROBLEM — N95.1 SYMPTOMS, SUCH AS FLUSHING, SLEEPLESSNESS, HEADACHE, LACK OF CONCENTRATION, ASSOCIATED WITH THE MENOPAUSE: Status: ACTIVE | Noted: 2022-10-16

## 2022-11-04 ENCOUNTER — LAB (OUTPATIENT)
Dept: LAB | Facility: CLINIC | Age: 57
End: 2022-11-04
Payer: COMMERCIAL

## 2022-11-04 DIAGNOSIS — R17 ELEVATED BILIRUBIN: ICD-10-CM

## 2022-11-04 LAB
ALBUMIN SERPL-MCNC: 4 G/DL (ref 3.4–5)
ALP SERPL-CCNC: 56 U/L (ref 40–150)
ALT SERPL W P-5'-P-CCNC: 20 U/L (ref 0–50)
AST SERPL W P-5'-P-CCNC: 22 U/L (ref 0–45)
BILIRUB DIRECT SERPL-MCNC: 0.2 MG/DL (ref 0–0.2)
BILIRUB SERPL-MCNC: 1.1 MG/DL (ref 0.2–1.3)
PROT SERPL-MCNC: 6.7 G/DL (ref 6.8–8.8)

## 2022-11-04 PROCEDURE — 80076 HEPATIC FUNCTION PANEL: CPT

## 2022-11-04 PROCEDURE — 36415 COLL VENOUS BLD VENIPUNCTURE: CPT

## 2022-11-14 NOTE — RESULT ENCOUNTER NOTE
Dear Coby,     All your recent labs are normal, improved, or pretty stable from previous.     Please, continue your current medications and/or supplements and follow up as we discussed at your last visit.     For additional lab test information, [email or web address] is an excellent reference.    Thank you so much for choosing [facility].  Please contact us with any questions that you may have.   We appreciate the opportunity to serve you now and look forward to supporting your healthcare needs for a long time to come!    Most Sincerely,     Steph Gutierrez MD

## 2023-01-13 ENCOUNTER — HOSPITAL ENCOUNTER (OUTPATIENT)
Dept: MAMMOGRAPHY | Facility: CLINIC | Age: 58
Discharge: HOME | End: 2023-01-13
Attending: FAMILY MEDICINE | Admitting: FAMILY MEDICINE
Payer: COMMERCIAL

## 2023-01-13 PROCEDURE — 77067 SCR MAMMO BI INCL CAD: CPT

## 2023-04-26 ENCOUNTER — MYC MEDICAL ADVICE (OUTPATIENT)
Dept: FAMILY MEDICINE | Facility: CLINIC | Age: 58
End: 2023-04-26
Payer: COMMERCIAL

## 2023-04-26 DIAGNOSIS — T75.3XXA MOTION SICKNESS, INITIAL ENCOUNTER: Primary | ICD-10-CM

## 2023-04-27 NOTE — TELEPHONE ENCOUNTER
Please review mychart message     MyChart message sent to patient for pharmacy and length of trip.     Estefania PERSON RN   [facility]

## 2023-04-28 NOTE — TELEPHONE ENCOUNTER
Patient will be gone for 11 days leaving May 18.  Please send order for scopolamine patches to [facility] pharmacy in [employer] (in [place])    Otto Vila RN [place] Triage

## 2023-05-04 RX ORDER — SCOLOPAMINE TRANSDERMAL SYSTEM 1 MG/1
1 PATCH, EXTENDED RELEASE TRANSDERMAL
Qty: 4 PATCH | Refills: 1 | Status: SHIPPED | OUTPATIENT
Start: 2023-05-04 | End: 2023-12-20

## 2023-08-19 LAB — NONINV COLON CA DNA+OCC BLD SCRN STL QL: NEGATIVE

## 2023-09-15 ENCOUNTER — MYC MEDICAL ADVICE (OUTPATIENT)
Dept: FAMILY MEDICINE | Facility: CLINIC | Age: 58
End: 2023-09-15
Payer: COMMERCIAL

## 2023-09-18 NOTE — TELEPHONE ENCOUNTER
My chart message sent     Awaiting reply     Alessandra Reyes RN, BSN  [facility]

## 2023-09-21 NOTE — TELEPHONE ENCOUNTER
Please see my chart message below     Please review and advise     Thank you     Alessandra Reyes RN, BSN  [place] Triage

## 2023-09-27 ENCOUNTER — TELEPHONE (OUTPATIENT)
Dept: FAMILY MEDICINE | Facility: CLINIC | Age: 58
End: 2023-09-27

## 2023-09-27 ENCOUNTER — NURSE TRIAGE (OUTPATIENT)
Dept: NURSING | Facility: CLINIC | Age: 58
End: 2023-09-27
Payer: COMMERCIAL

## 2023-09-27 ENCOUNTER — OFFICE VISIT (OUTPATIENT)
Dept: URGENT CARE | Facility: URGENT CARE | Age: 58
End: 2023-09-27
Payer: COMMERCIAL

## 2023-09-27 VITALS
HEART RATE: 68 BPM | TEMPERATURE: 98.7 F | OXYGEN SATURATION: 98 % | DIASTOLIC BLOOD PRESSURE: 85 MMHG | SYSTOLIC BLOOD PRESSURE: 130 MMHG

## 2023-09-27 DIAGNOSIS — R42 VERTIGO: ICD-10-CM

## 2023-09-27 DIAGNOSIS — R11.0 NAUSEA: Primary | ICD-10-CM

## 2023-09-27 PROCEDURE — 99214 OFFICE O/P EST MOD 30 MIN: CPT | Mod: 25 | Performed by: FAMILY MEDICINE

## 2023-09-27 PROCEDURE — 96372 THER/PROPH/DIAG INJ SC/IM: CPT | Performed by: FAMILY MEDICINE

## 2023-09-27 RX ORDER — PROCHLORPERAZINE MALEATE 10 MG
10 TABLET ORAL EVERY 8 HOURS PRN
Qty: 15 TABLET | Refills: 0 | Status: SHIPPED | OUTPATIENT
Start: 2023-09-27 | End: 2023-12-20

## 2023-09-27 NOTE — TELEPHONE ENCOUNTER
Symptoms    Describe your symptoms: vertigo, dizziness - [facility]- got better for a day  - now back with a vengence    Any pain: Yes:     How long have you been having symptoms: Over 2 weeks      Have you been seen for this:  Yes: above    Appointment offered?: Yes: Has  one already but not for awhile    Triage offered?: Yes:     Home remedies tried: Miya waldrop    Preferred Pharmacy:   [facility] [address] [address] [address]  Phone: [phone] Fax: [phone]      Could we send this information to you in [facility] or would you prefer to receive a phone call?:   Patient would prefer a phone call   Okay to leave a detailed message?: Yes at Cell number on file:    Telephone Information:   Mobile [phone]

## 2023-09-27 NOTE — TELEPHONE ENCOUNTER
"Patient is having vertigo and started two weeks ago.  Patient reached out for Meclizine but has not heard back yet.  Today Coby is having severe vertigo and cannot even sit up as everything is \"evolving and tilting\".   Patient cannot lie flat as it makes the vertigo worse.  Patient states that she will phone her  and go to urgent care or ER.        Reason for Disposition   SEVERE dizziness (e.g., unable to stand, requires support to walk, feels like passing out now)    Additional Information   Negative: SEVERE difficulty breathing (e.g., struggling for each breath, speaks in single words)   Negative: Shock suspected (e.g., cold/pale/clammy skin, too weak to stand, low BP, rapid pulse)   Negative: Difficult to awaken or acting confused (e.g., disoriented, slurred speech)   Negative: Fainted, and still feels dizzy afterwards   Negative: Overdose (accidental or intentional) of medications   Negative: New neurologic deficit that is present now: * Weakness of the face, arm, or leg on one side of the body * Numbness of the face, arm, or leg on one side of the body * Loss of speech or garbled speech   Negative: Heart beating < 50 beats per minute OR > 140 beats per minute   Negative: Sounds like a life-threatening emergency to the triager    Protocols used: Dizziness-A-OH    "

## 2023-09-28 NOTE — PATIENT INSTRUCTIONS
Tonight you got an injection of Compazine 10 mg IM x 1 dose in clinic.  This should help with the nausea and it may also help some with the vertigo itself.  Hold off on using more meclizine while using the Compazine.    Try the [place] half-somersault maneuver to see if this helps relieve your symptoms better than the Epley maneuver that you're tried.    Keep appointment with primary care on Friday.

## 2023-09-28 NOTE — PROGRESS NOTES
"  ICD-10-CM    1. Nausea  R11.0 prochlorperazine (COMPAZINE) 10 MG tablet     prochlorperazine (COMPAZINE) injection 10 mg     DISCONTINUED: prochlorperazine (COMPAZINE) injection 10 mg      2. Vertigo  R42 prochlorperazine (COMPAZINE) injection 10 mg     DISCONTINUED: prochlorperazine (COMPAZINE) injection 10 mg        Acute exacerbation of ongoing vertigo.  No new findings to suggest need for emergent imaging or ER evaluation [facility] unless status changes.      PLAN:  Compazine 10 mg IM x 1 dose in clinic [facility].  Rx for oral Compazine sent to pharmacy.  Hold off on meclizine while using the Compazine.  Discussed trying the [place] half-somersault maneuver, which pt can see a video of online, to see if this is helpful.      Follow up with PCP on Friday.  Can discuss referral to ENT for additional evaluation/treatment at that time.    SUBJECTIVE:  Coby Poe is a 58 year old female who presents to [facility] with acute worsening of her ongoing vertigo this morning.  \"It feels like I'm sloshing around underwater.\"  Has been trying to hold head very still.  No visual acuity changes.  No focal weakness, numbness, or tingling.  Does have nausea.  Tried some old meclizine pills that she had on hand, but this didn't help much.  Has also been using Sudafed and Flonase, which don't seem to be helping at all.  No ear pain.    OBJECTIVE:  /85 (BP Location: Right arm, Patient Position: Sitting, Cuff Size: Adult Regular)   Pulse 68   Temp 98.7  F (37.1  C) (Oral)   LMP 03/19/2008   SpO2 98%   GEN: appears uncomfortable but not in acute distress  ENT: TMs and canals appear normal, oral MMM, normal pharynx, uvula midline.  Neck: no LAD noted  EYES: PERRL, anicteric sclerae  Neuro: no facial droop, tongue midline, no focal weakness in extremities  "

## 2023-09-28 NOTE — TELEPHONE ENCOUNTER
Huddled with pcp- advises er rule out encephalopathy     Called patient states no fever and headache did go away- updated pcp    Advises keep appointment and go to ER if fever, symptoms worsen or severe headache comes back.    Patient agrees to plan.

## 2023-09-28 NOTE — TELEPHONE ENCOUNTER
"  Patient went to  last night for vertigo the last 2 weeks     [facility] - can not get into for 2 weeks     Dizziness started about one week after  cold sore in nose- didn't take Valtrex- cold sore is better   Feels like she is swimming sitting or laying, normally only when lays   Vertigo worsens when she tilts her head forward and back. - new   Yesterday- has chills and sweats, did not take temp.   Headache- \"huge\" today but was advised by the [facility] center not to drink any  caffeine- advised drink one  cup to see if it helps     Advised  Tylenol and or ibuprofen as directed   Took compazine - helps some with nausea - no emesis   Right ears feels plugged- looked \"fine last night\"- can hear out of it, reduced hearing, more muffled     Vision- not normal-things move \"can't' see straight\" same last night when in - since yesterday am  Thought process is ok  Speech is normal.   No extremities weakness or numbness or tingling.     The eply maneuver and [place] roll does not help.   Took meclizine took outdated for the past 2 weeks advised to try no . Last took at 6pm last night.it did not help     Advised till talk with pcp today after 1 when she gets in and call her back.   Future Appointments 2023 - 3/26/2024        Date Visit Type Length Department Provider     2023  2:00 PM OFFICE VISIT 40 min  FAMILY PRACTICE Steph Gutierrez MD    Location Instructions:     [facility] is located at [facility] [facility], along [address] Free parking is available; access the lot by turning north from [address]  onto [facility], then west onto [facility].              2023  4:20 PM MYC PREVENTATIVE ADULT VISIT 40 min [facility] PRACTICE Steph Gutierrez MD    Location Instructions:     [facility] is located at [address] [facility] [address], [address] Free parking " is available; access the lot by turning north from [address] onto [facility], then west onto [facility].

## 2023-09-29 ENCOUNTER — OFFICE VISIT (OUTPATIENT)
Dept: FAMILY MEDICINE | Facility: CLINIC | Age: 58
End: 2023-09-29
Payer: COMMERCIAL

## 2023-09-29 ENCOUNTER — ANCILLARY PROCEDURE (OUTPATIENT)
Dept: GENERAL RADIOLOGY | Facility: CLINIC | Age: 58
End: 2023-09-29
Attending: FAMILY MEDICINE
Payer: COMMERCIAL

## 2023-09-29 VITALS
DIASTOLIC BLOOD PRESSURE: 76 MMHG | BODY MASS INDEX: 26.49 KG/M2 | SYSTOLIC BLOOD PRESSURE: 132 MMHG | OXYGEN SATURATION: 100 % | RESPIRATION RATE: 12 BRPM | TEMPERATURE: 97.4 F | HEIGHT: 65 IN | WEIGHT: 159 LBS | HEART RATE: 77 BPM

## 2023-09-29 DIAGNOSIS — M79.674 GREAT TOE PAIN, RIGHT: ICD-10-CM

## 2023-09-29 DIAGNOSIS — B02.8 HERPES ZOSTER WITH COMPLICATION: ICD-10-CM

## 2023-09-29 DIAGNOSIS — H81.13 BENIGN PAROXYSMAL POSITIONAL VERTIGO, BILATERAL: Primary | ICD-10-CM

## 2023-09-29 DIAGNOSIS — E78.5 HYPERLIPIDEMIA LDL GOAL <130: ICD-10-CM

## 2023-09-29 DIAGNOSIS — E78.5 HYPERLIPIDEMIA LDL GOAL <130: Chronic | ICD-10-CM

## 2023-09-29 LAB — URATE SERPL-MCNC: 3.8 MG/DL (ref 2.4–5.7)

## 2023-09-29 PROCEDURE — 36415 COLL VENOUS BLD VENIPUNCTURE: CPT | Performed by: FAMILY MEDICINE

## 2023-09-29 PROCEDURE — 99214 OFFICE O/P EST MOD 30 MIN: CPT | Performed by: FAMILY MEDICINE

## 2023-09-29 PROCEDURE — 84550 ASSAY OF BLOOD/URIC ACID: CPT | Performed by: FAMILY MEDICINE

## 2023-09-29 PROCEDURE — 73660 X-RAY EXAM OF TOE(S): CPT | Mod: TC | Performed by: RADIOLOGY

## 2023-09-29 RX ORDER — AMOXICILLIN 500 MG/1
500 CAPSULE ORAL 2 TIMES DAILY
Qty: 14 CAPSULE | Refills: 0 | Status: SHIPPED | OUTPATIENT
Start: 2023-09-29 | End: 2023-10-06

## 2023-09-29 RX ORDER — SIMVASTATIN 5 MG
5 TABLET ORAL AT BEDTIME
Qty: 90 TABLET | Refills: 3 | Status: SHIPPED | OUTPATIENT
Start: 2023-09-29 | End: 2023-12-20

## 2023-09-29 RX ORDER — VALACYCLOVIR HYDROCHLORIDE 1 G/1
1000 TABLET, FILM COATED ORAL 3 TIMES DAILY
Qty: 21 TABLET | Refills: 3 | Status: SHIPPED | OUTPATIENT
Start: 2023-09-29 | End: 2023-12-20

## 2023-09-29 RX ORDER — MECLIZINE HYDROCHLORIDE 25 MG/1
25 TABLET ORAL 3 TIMES DAILY PRN
Qty: 30 TABLET | Refills: 1 | Status: SHIPPED | OUTPATIENT
Start: 2023-09-29

## 2023-09-29 ASSESSMENT — ENCOUNTER SYMPTOMS: HEADACHES: 1

## 2023-09-29 NOTE — PATIENT INSTRUCTIONS
" [facility]  [address] [place] [id number]  Office: [phone]   Fax:    [phone]     You can do your vaccinations/immunizations and/or BP checks in any [facility] pharmacy:     To make a pharmacy only appointment online, please go to [email or web address] and select \"Click here to schedule a Vaccination or Blood Pressure Check at available [facility] Pharmacies \" at the bottom of the first page.  You can then select a location, then date and time bubbles that best fits your schedule.       To prevent and/or treat mild side effects (body aches, chills, fever, fatigue) -not related to allergies - after receiving COVID-19 novel coronavirus vaccine or any other vaccines :     Go into your shots very well hydrated and stay well hydrated for 3-4 days afterwards.     Don't take the Ibuprofen prior to your shot, but rather afterward.  No prescription steroids within 2 weeks of the shots as they can suppress your immune system, but Ibuprofen is not a significant immunosuppressant at the 400mg dosage.   Ok to take tylenol 2-325 or 2-500mg tabs prior to vaccine.    Take claritin (a non-sedating anti-histamine) 10 mg daily for 2 days and/or 2 over the counter 200mg  Ibuprofen pills every 4-6 hours with food while awake for the next 2-3 days .  Take them with food so they don't irritate your stomach.  You can do the above regimen for 2-3 days after your first,  second, or third  shots to decrease the possibility of achiness, fever, chills after your COVID-19 novel coronavirus or any other current vaccines.             "

## 2023-09-29 NOTE — PROGRESS NOTES
Assessment & Plan       ICD-10-CM    1. Benign paroxysmal positional vertigo, bilateral  H81.13 amoxicillin (AMOXIL) 500 MG capsule     meclizine (ANTIVERT) 25 MG tablet      2. Hyperlipidemia LDL goal <130  E78.5 TSH with free T4 reflex     Lipid panel reflex to direct LDL Fasting     Comprehensive metabolic panel     CBC with platelets     Albumin Random Urine Quantitative with Creat Ratio     simvastatin (ZOCOR) 5 MG tablet      3. Herpes zoster with complication  B02.8 valACYclovir (VALTREX) 1000 mg tablet      4. Hyperlipidemia LDL goal <130- on simvastatin 5mg nightly since 10/2020   E78.5 simvastatin (ZOCOR) 5 MG tablet      5. Great toe pain, right- IP joint stiff and can't bend - gets red at times, better if well hydrated and takes ibuprofen - was red/swollen at first  M79.674 Uric acid     XR Toe Right G/E 2 Views        Return in 3 months (on 12/20/2023) for Wellness/Preventative Visit, w/ Dr. AMYA for 40 minute appointment.    Future Appointments 9/29/2023 - 3/27/2024        Date Visit Type Length Department Provider     12/20/2023  4:20 PM MYC PREVENTATIVE ADULT VISIT 40 min  FAMILY PRACTICE Steph Gutierrez MD    Location Instructions:     [facility] is located at [address] [place] [address], along [address] Free parking is available; access the lot by turning north from [address] onto [facility], then west onto [facility].                    Offered referral to vestibular therapy, but pt declines at this time.   Please, call our clinic or go to the ER immediately if signs or symptoms worsen or fail to improve as anticipated.   Ordering of each unique test  Prescription drug management  32 minutes spent by me on the date of the encounter doing chart review, history and exam, documentation and further activities per the note       BMI:   Estimated body mass index is 26.46 kg/m  as calculated from the following:    Height as of this encounter: 1.651 m (5'  "5\").    Weight as of this encounter: 72.1 kg (159 lb).   Weight management plan: Discussed healthy diet and exercise guidelines    MEDICATIONS:   Orders Placed This Encounter   Medications    VITAMIN D PO    Turmeric (QC TUMERIC COMPLEX PO)    valACYclovir (VALTREX) 1000 mg tablet     Sig: Take 1 tablet (1,000 mg) by mouth 3 times daily     Dispense:  21 tablet     Refill:  3    amoxicillin (AMOXIL) 500 MG capsule     Sig: Take 1 capsule (500 mg) by mouth 2 times daily for 7 days     Dispense:  14 capsule     Refill:  0     Profile Rx: patient will contact pharmacy when needed    meclizine (ANTIVERT) 25 MG tablet     Sig: Take 1 tablet (25 mg) by mouth 3 times daily as needed for dizziness Vertigo     Dispense:  30 tablet     Refill:  1     Profile Rx: patient will contact pharmacy when needed    simvastatin (ZOCOR) 5 MG tablet     Sig: Take 1 tablet (5 mg) by mouth At Bedtime     Dispense:  90 tablet     Refill:  3     Profile Rx: patient will contact pharmacy when needed          - Continue other medications without change  Work on weight loss  Regular exercise  See Patient Instructions         Steph Gutierrez MD  [facility] PRIOR CHANCE Tenorio is a 58 year old, presenting for the following health issues:  Musculoskeletal Problem, Dizziness, and Headache      ICD-10-CM    1. Benign paroxysmal positional vertigo, bilateral  H81.13 amoxicillin (AMOXIL) 500 MG capsule     meclizine (ANTIVERT) 25 MG tablet      2. Hyperlipidemia LDL goal <130  E78.5 TSH with free T4 reflex     Lipid panel reflex to direct LDL Fasting     Comprehensive metabolic panel     CBC with platelets     Albumin Random Urine Quantitative with Creat Ratio     simvastatin (ZOCOR) 5 MG tablet      3. Herpes zoster with complication  B02.8 valACYclovir (VALTREX) 1000 mg tablet      4. Hyperlipidemia LDL goal <130- on simvastatin 5mg nightly since 10/2020   E78.5 simvastatin (ZOCOR) 5 MG tablet      5. Great toe " pain, right- IP joint stiff and can't bend - gets red at times, better if well hydrated and takes ibuprofen - was red/swollen at first  M79.674 Uric acid     XR Toe Right G/E 2 Views     Uric acid              9/29/2023     1:45 PM   Additional Questions   Roomed by Casi WRAY   Accompanied by Self       History of Present Illness       Reason for visit:  Large Right toe swollen and doesn't bend  Symptom onset:  More than a month  Symptoms include:  Swollen toe sometimes gets red and hurts  Symptom intensity:  Mild  Symptom progression:  Staying the same  Had these symptoms before:  No  What makes it better:  Advil    She eats 2-3 servings of fruits and vegetables daily.She consumes 0 sweetened beverage(s) daily.She exercises with enough effort to increase her heart rate 30 to 60 minutes per day.  She exercises with enough effort to increase her heart rate 4 days per week. She is missing 2 dose(s) of medications per week.  She is not taking prescribed medications regularly due to remembering to take.     Right toe - red, unable to bed -- always has swelling in right leg.  Off and on pain. Last dose of Advil last night 8p.      Urgent care 09/27/2023 - Nausea and Vertigo. - seemed worse [facility] .   Had a headache for a day after [facility] told her not to drink caffeine.  ABREU went away after drinking caffeine yesterday am.   Took 2 benadryl last night and feels much better this am.   Was taught the [place] Roll maneuver by vestibular PT at the [facility]- did help  Nasal drainage - Sudafed, Zyrtec  Has had an inner ear infection in past.   All symptoms are better today from last 3 weeks.   No  vomiting or diarrhea. Felt like she could have vomited and then at the end of the 3 weeks, 2 days ago got a shot of compazine and was given an rx to take home for oral compazine.   Her mother had inner ear issues for a while.    For the future pt wonders what treatment she should start right  away.   What to do if has vertigo again? Who to see?   No hx of head trauma.   No changes in speech, swallowing, hearing, coordination  or vision.         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 Vaughn -ob/gyn     Other chronic serous otitis media    EDEMA - right lower extremity - p dermoid teratoma    Uterine leiomyoma    CARDIOVASCULAR SCREENING; LDL GOAL LESS THAN 160    Screening for cervical cancer- repeat in 2025 - s/p LSH with right oophorectomy in 2009 - left ovary in place      Hyperlipidemia LDL goal <130    Elevated bilirubin - 1.6 from labs from 10/3/2022 - needs recheck in the next month 11/2022 - future orders in Epic     Symptoms, such as flushing, sleeplessness, headache, lack of concentration, associated with the menopause    Trapezius muscle spasm-happened with pouring sand from 40lbs bag on 9/29/2022        Current Outpatient Medications   Medication Sig Dispense Refill    amoxicillin (AMOXIL) 500 MG capsule Take 1 capsule (500 mg) by mouth 2 times daily for 7 days 14 capsule 0    cetirizine (ZYRTEC) 10 MG tablet Take 10 mg by mouth daily      estradiol (ESTRACE) 1 MG tablet Take 1 tablet (1 mg) by mouth daily 90 tablet 3    meclizine (ANTIVERT) 25 MG tablet Take 1 tablet (25 mg) by mouth 3 times daily as needed for dizziness Vertigo 30 tablet 1    Multiple Vitamin (MULTI-VITAMIN) per tablet Take 1 tablet by mouth daily 100 tablet 12    simvastatin (ZOCOR) 5 MG tablet Take 1 tablet (5 mg) by mouth At Bedtime 90 tablet 3    Turmeric (QC TUMERIC COMPLEX PO)       valACYclovir (VALTREX) 1000 mg tablet Take 1 tablet (1,000 mg) by mouth 3 times daily 21 tablet 3    VITAMIN D PO       cyclobenzaprine (FLEXERIL) 5 MG tablet Take 1-2 tablets (5-10 mg) by mouth 3 times daily as needed for muscle spasms (Patient not taking: Reported on 9/27/2023) 21 tablet 1     "prochlorperazine (COMPAZINE) 10 MG tablet Take 1 tablet (10 mg) by mouth every 8 hours as needed for nausea or vomiting (Patient not taking: Reported on 9/29/2023) 15 tablet 0    scopolamine (TRANSDERM) 1 MG/3DAYS 72 hr patch Place 1 patch onto the skin every 72 hours For motion sickness- place 12 hours prior to boarding , if possible (Patient not taking: Reported on 9/27/2023) 4 patch 1        No Known Allergies           Review of Systems   Neurological:  Positive for headaches.      Constitutional, HEENT, cardiovascular, pulmonary, GI, , musculoskeletal, neuro, skin, endocrine and psych systems are negative, except as otherwise noted.      Objective    /76 (BP Location: Left arm, Patient Position: Chair, Cuff Size: Adult Large)   Pulse 77   Temp 97.4  F (36.3  C) (Tympanic)   Resp 12   Ht 1.651 m (5' 5\")   Wt 72.1 kg (159 lb)   LMP 03/19/2008   SpO2 100%   BMI 26.46 kg/m    Body mass index is 26.46 kg/m .  Physical Exam   GENERAL: healthy, alert and no distress  EYES: Eyes grossly normal to inspection, PERRL and conjunctivae and sclerae normal  HENT: ear canals and TM's normal, nose and mouth without ulcers or lesions  NECK: no adenopathy, no asymmetry, masses, or scars and thyroid normal to palpation  RESP: lungs clear to auscultation - no rales, rhonchi or wheezes  BREAST: normal without masses, tenderness or nipple discharge and no palpable axillary masses or adenopathy  CV: regular rate and rhythm, normal S1 S2, no S3 or S4, no murmur, click or rub, no peripheral edema and peripheral pulses strong  ABDOMEN: soft, nontender, no hepatosplenomegaly, no masses and bowel sounds normal  MS: no gross musculoskeletal defects noted, no edema  SKIN: no suspicious lesions or rashes  NEURO: Normal strength and tone, mentation intact and speech normal  PSYCH: mentation appears normal, affect normal/bright    Lab on 11/04/2022   Component Date Value Ref Range Status    Bilirubin Total 11/04/2022 1.1  0.2 - " 1.3 mg/dL Final    Bilirubin Direct 11/04/2022 0.2  0.0 - 0.2 mg/dL Final    Protein Total 11/04/2022 6.7 (L)  6.8 - 8.8 g/dL Final    Albumin 11/04/2022 4.0  3.4 - 5.0 g/dL Final    Alkaline Phosphatase 11/04/2022 56  40 - 150 U/L Final    AST 11/04/2022 22  0 - 45 U/L Final    ALT 11/04/2022 20  0 - 50 U/L Final

## 2023-10-04 DIAGNOSIS — N95.1 SYMPTOMS, SUCH AS FLUSHING, SLEEPLESSNESS, HEADACHE, LACK OF CONCENTRATION, ASSOCIATED WITH THE MENOPAUSE: ICD-10-CM

## 2023-10-04 RX ORDER — ESTRADIOL 1 MG/1
1 TABLET ORAL DAILY
Qty: 90 TABLET | Refills: 0 | Status: SHIPPED | OUTPATIENT
Start: 2023-10-04 | End: 2023-12-20

## 2023-11-19 ENCOUNTER — HEALTH MAINTENANCE LETTER (OUTPATIENT)
Age: 58
End: 2023-11-19

## 2023-12-14 ENCOUNTER — LAB (OUTPATIENT)
Dept: LAB | Facility: CLINIC | Age: 58
End: 2023-12-14
Payer: COMMERCIAL

## 2023-12-14 DIAGNOSIS — E78.5 HYPERLIPIDEMIA LDL GOAL <130: ICD-10-CM

## 2023-12-14 LAB
ALBUMIN SERPL BCG-MCNC: 4.3 G/DL (ref 3.5–5.2)
ALP SERPL-CCNC: 51 U/L (ref 40–150)
ALT SERPL W P-5'-P-CCNC: 58 U/L (ref 0–50)
ANION GAP SERPL CALCULATED.3IONS-SCNC: 9 MMOL/L (ref 7–15)
AST SERPL W P-5'-P-CCNC: 50 U/L (ref 0–45)
BILIRUB SERPL-MCNC: 0.8 MG/DL
BUN SERPL-MCNC: 16.2 MG/DL (ref 6–20)
CALCIUM SERPL-MCNC: 9.3 MG/DL (ref 8.6–10)
CHLORIDE SERPL-SCNC: 106 MMOL/L (ref 98–107)
CHOLEST SERPL-MCNC: 186 MG/DL
CREAT SERPL-MCNC: 0.78 MG/DL (ref 0.51–0.95)
CREAT UR-MCNC: 98.3 MG/DL
DEPRECATED HCO3 PLAS-SCNC: 26 MMOL/L (ref 22–29)
EGFRCR SERPLBLD CKD-EPI 2021: 88 ML/MIN/1.73M2
ERYTHROCYTE [DISTWIDTH] IN BLOOD BY AUTOMATED COUNT: 13.1 % (ref 10–15)
FASTING STATUS PATIENT QL REPORTED: YES
GLUCOSE SERPL-MCNC: 85 MG/DL (ref 70–99)
HCT VFR BLD AUTO: 39.1 % (ref 35–47)
HDLC SERPL-MCNC: 60 MG/DL
HGB BLD-MCNC: 13.1 G/DL (ref 11.7–15.7)
LDLC SERPL CALC-MCNC: 109 MG/DL
MCH RBC QN AUTO: 31.6 PG (ref 26.5–33)
MCHC RBC AUTO-ENTMCNC: 33.5 G/DL (ref 31.5–36.5)
MCV RBC AUTO: 94 FL (ref 78–100)
MICROALBUMIN UR-MCNC: <12 MG/L
MICROALBUMIN/CREAT UR: NORMAL MG/G{CREAT}
NONHDLC SERPL-MCNC: 126 MG/DL
PLATELET # BLD AUTO: 236 10E3/UL (ref 150–450)
POTASSIUM SERPL-SCNC: 4.6 MMOL/L (ref 3.4–5.3)
PROT SERPL-MCNC: 6.6 G/DL (ref 6.4–8.3)
RBC # BLD AUTO: 4.15 10E6/UL (ref 3.8–5.2)
SODIUM SERPL-SCNC: 141 MMOL/L (ref 135–145)
TRIGL SERPL-MCNC: 86 MG/DL
TSH SERPL DL<=0.005 MIU/L-ACNC: 2.62 UIU/ML (ref 0.3–4.2)
WBC # BLD AUTO: 4.9 10E3/UL (ref 4–11)

## 2023-12-14 PROCEDURE — 36415 COLL VENOUS BLD VENIPUNCTURE: CPT

## 2023-12-14 PROCEDURE — 84443 ASSAY THYROID STIM HORMONE: CPT

## 2023-12-14 PROCEDURE — 82043 UR ALBUMIN QUANTITATIVE: CPT

## 2023-12-14 PROCEDURE — 85027 COMPLETE CBC AUTOMATED: CPT

## 2023-12-14 PROCEDURE — 80053 COMPREHEN METABOLIC PANEL: CPT

## 2023-12-14 PROCEDURE — 82570 ASSAY OF URINE CREATININE: CPT

## 2023-12-14 PROCEDURE — 80061 LIPID PANEL: CPT

## 2023-12-20 ENCOUNTER — OFFICE VISIT (OUTPATIENT)
Dept: FAMILY MEDICINE | Facility: CLINIC | Age: 58
End: 2023-12-20
Payer: COMMERCIAL

## 2023-12-20 VITALS
TEMPERATURE: 97.1 F | WEIGHT: 158 LBS | DIASTOLIC BLOOD PRESSURE: 70 MMHG | SYSTOLIC BLOOD PRESSURE: 110 MMHG | BODY MASS INDEX: 26.33 KG/M2 | HEART RATE: 83 BPM | HEIGHT: 65 IN | OXYGEN SATURATION: 99 % | RESPIRATION RATE: 16 BRPM

## 2023-12-20 DIAGNOSIS — Z12.31 VISIT FOR SCREENING MAMMOGRAM: ICD-10-CM

## 2023-12-20 DIAGNOSIS — Z23 NEED FOR HEPATITIS B VACCINATION: ICD-10-CM

## 2023-12-20 DIAGNOSIS — E78.5 HYPERLIPIDEMIA LDL GOAL <130: ICD-10-CM

## 2023-12-20 DIAGNOSIS — E78.5 HYPERLIPIDEMIA LDL GOAL <130: Chronic | ICD-10-CM

## 2023-12-20 DIAGNOSIS — Z00.00 ROUTINE GENERAL MEDICAL EXAMINATION AT A HEALTH CARE FACILITY: Primary | ICD-10-CM

## 2023-12-20 DIAGNOSIS — B02.8 HERPES ZOSTER WITH COMPLICATION: ICD-10-CM

## 2023-12-20 DIAGNOSIS — Z12.4 SCREENING FOR CERVICAL CANCER: ICD-10-CM

## 2023-12-20 DIAGNOSIS — R17 ELEVATED BILIRUBIN: ICD-10-CM

## 2023-12-20 DIAGNOSIS — Z12.11 SCREEN FOR COLON CANCER: ICD-10-CM

## 2023-12-20 DIAGNOSIS — N95.1 SYMPTOMS, SUCH AS FLUSHING, SLEEPLESSNESS, HEADACHE, LACK OF CONCENTRATION, ASSOCIATED WITH THE MENOPAUSE: ICD-10-CM

## 2023-12-20 DIAGNOSIS — E66.3 OVERWEIGHT (BMI 25.0-29.9): ICD-10-CM

## 2023-12-20 PROCEDURE — 90746 HEPB VACCINE 3 DOSE ADULT IM: CPT | Performed by: FAMILY MEDICINE

## 2023-12-20 PROCEDURE — 99396 PREV VISIT EST AGE 40-64: CPT | Mod: 25 | Performed by: FAMILY MEDICINE

## 2023-12-20 PROCEDURE — 90471 IMMUNIZATION ADMIN: CPT | Performed by: FAMILY MEDICINE

## 2023-12-20 PROCEDURE — 99214 OFFICE O/P EST MOD 30 MIN: CPT | Mod: 25 | Performed by: FAMILY MEDICINE

## 2023-12-20 RX ORDER — ESTRADIOL 0.5 MG/1
0.5 TABLET ORAL DAILY
Qty: 90 TABLET | Refills: 3 | Status: SHIPPED | OUTPATIENT
Start: 2023-12-20 | End: 2024-02-14 | Stop reason: ALTCHOICE

## 2023-12-20 RX ORDER — VALACYCLOVIR HYDROCHLORIDE 1 G/1
1000 TABLET, FILM COATED ORAL 3 TIMES DAILY
Qty: 21 TABLET | Refills: 3 | Status: SHIPPED | OUTPATIENT
Start: 2023-12-20

## 2023-12-20 RX ORDER — SIMVASTATIN 5 MG
5 TABLET ORAL AT BEDTIME
Qty: 90 TABLET | Refills: 3 | Status: SHIPPED | OUTPATIENT
Start: 2023-12-20

## 2023-12-20 ASSESSMENT — ENCOUNTER SYMPTOMS
COUGH: 0
ABDOMINAL PAIN: 0
NAUSEA: 0
DYSURIA: 0
DIZZINESS: 1
HEMATURIA: 0
WEAKNESS: 0
FEVER: 0
PARESTHESIAS: 0
JOINT SWELLING: 1
MYALGIAS: 1
NERVOUS/ANXIOUS: 1
SHORTNESS OF BREATH: 0
HEMATOCHEZIA: 0
CHILLS: 0
ARTHRALGIAS: 0
PALPITATIONS: 0
EYE PAIN: 0
DIARRHEA: 0
HEADACHES: 0
HEARTBURN: 0
FREQUENCY: 0
CONSTIPATION: 1
SORE THROAT: 0
BREAST MASS: 0

## 2023-12-20 NOTE — PATIENT INSTRUCTIONS
Preventive Health Recommendations  Female Ages 50 - 64    Yearly exam: See your health care provider every year in order to  Review health changes.   Discuss preventive care.    Review your medicines if your doctor has prescribed any.    Get a Pap test every three years (unless you have an abnormal result and your provider advises testing more often).  If you get Pap tests with HPV test, you only need to test every 5 years, unless you have an abnormal result.   You do not need a Pap test if your uterus was removed (hysterectomy) and you have not had cancer.  You should be tested each year for STDs (sexually transmitted diseases) if you're at risk.   Have a mammogram every 1 to 2 years.  Have a colonoscopy at age 45, or have a yearly FIT test (stool test). These exams screen for colon cancer.    Have a cholesterol test every 5 years, or more often if advised.  Have a diabetes test (fasting glucose) every three years. If you are at risk for diabetes, you should have this test more often.   If you are at risk for osteoporosis (brittle bone disease), think about having a bone density scan (DEXA).    Shots: Get a flu shot each year. Get a tetanus shot every 10 years.    Nutrition:   Eat at least 5 servings of fruits and vegetables each day.  Eat whole-grain bread, whole-wheat pasta and brown rice instead of white grains and rice.  Get adequate Calcium and Vitamin D.     Lifestyle  Exercise at least 150 minutes a week (30 minutes a day, 5 days a week). This will help you control your weight and prevent disease.  Limit alcohol to one drink per day.  No smoking.   Wear sunscreen to prevent skin cancer.   See your dentist every six months for an exam and cleaning.  See your eye doctor every 1 to 2 years.    Thank you so much or choosing [facility]  for your Health Care. It was a pleasure seeing you at your visit today! Please contact us with any questions or concerns you may have.                  "  Steph Gutierrez MD                              To reach your [facility] care team after hours call:   [phone] press #[phone] \"to speak with your care team\".  This will get you to our clinic instead of routing to central [facility]  scheduling.     PLEASE NOTE OUR HOURS HAVE CHANGED secondary to COVID-19 coronavirus pandemic, as we are trying to minimize patient exposure to the virus,  which is now widespread in the state.  These hours may change with very little notice.  We apologize for any inconvenience.       Our current clinic hours are:          Monday- Thursday   7:00am - 6:00pm  in person.      Friday  7:00am- 5:00pm                       Saturday and Sunday : Closed to in person and virtual visits        We have telephone and virtual visit times available between    7:00am - 6pm on Monday-Friday as well.                                                Phone:  [phone]      Our pharmacy hours: Monday through Friday 8:00am to 5:00pm                        Saturday - 9:00 am to 12 noon       Sunday : Closed.              Phone:  [phone]              ###  Please note: at this time we are not accepting any walk-in visits. ###      There is also information available at our web site:  [email or web address]    If your provider ordered any lab tests and you do not receive the results within 10 business days, please call the clinic.    If you need a medication refill please contact your pharmacy.  Please allow 3 business days for your refill to be completed.    Our clinic offers telephone visits and e visits.  Please ask one of your team members to explain more.      Use "[employer]"hart (secure email communication and access to your chart) to send your primary care provider a message or make an appointment. Ask someone on your Team how to sign up for [employer].                "

## 2023-12-20 NOTE — PROGRESS NOTES
SUBJECTIVE:   Coby is a 58 year old, presenting for the followin. Routine general medical examination at a health care facility    2. Need for hepatitis B vaccination    3. Overweight (BMI 25.0-29.9)- pt desires 10-15 lbs weight loss    4. Elevated bilirubin - 1.6 from labs from 10/3/2022 - recheck 2023 = normal    5. Hyperlipidemia LDL goal <130 - discussed lab results and needs refills    6. Screening for cervical cancer- repeat in  - s/p LSH with right oophorectomy in  - left ovary in place      7. Symptoms, such as flushing, sleeplessness, headache, lack of concentration, associated with the menopause - discussed HRT - pt desires estradiol - s/p LSH in     8. Screen for colon cancer - negative cologuard 2023 - repeat in  or do colonoscopy - recommended    9. Hyperlipidemia LDL goal <130- on simvastatin 5mg nightly since 10/2020     10. Herpes zoster with complication - stable - needs refills on meds    11. Visit for screening mammogram        Physical        2023     4:42 PM   Additional Questions   Roomed by balwinder zimmerman       Healthy Habits:     Getting at least 3 servings of Calcium per day:  Yes    Bi-annual eye exam:  Yes    Dental care twice a year:  Yes    Sleep apnea or symptoms of sleep apnea:  None    Diet:  Vegetarian/vegan    Frequency of exercise:  4-5 days/week    Duration of exercise:  45-60 minutes    Taking medications regularly:  Yes    Medication side effects:  Other    Additional concerns today:  No  Trying to lose weight - exercise is great. Nutrition has always been good - doesn't drink.      Height: 5ft 5in.   Wt Readings from Last 2 Encounters:   23 71.7 kg (158 lb)   23 72.1 kg (159 lb)      Body mass index is 26.29 kg/m .     Today's PHQ-2 Score:       2023    10:46 AM   PHQ-2 (  [employer])   Q1: Little interest or pleasure in doing things 0   Q2: Feeling down, depressed or hopeless 1   PHQ-2 Score 1   Q1: Little interest or pleasure in  doing things Not at all   Q2: Feeling down, depressed or hopeless Several days   PHQ-2 Score 1             Hyperlipidemia Follow-Up- reviewed pt's labs from last week - GREAT! - see below.     Are you regularly taking any medication or supplement to lower your cholesterol?   Yes- simvastatin  Are you having muscle aches or other side effects that you think could be caused by your cholesterol lowering medication?  Yes- a little bit.       Social History     Tobacco Use    Smoking status: Never    Smokeless tobacco: Never   Substance Use Topics    Alcohol use: Yes     Comment: 2 to 3 drinks a year.             12/20/2023    10:45 AM   Alcohol Use   Prescreen: >3 drinks/day or >7 drinks/week? No     Reviewed orders with patient.  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 Vaughn -ob/gyn     Other chronic serous otitis media    EDEMA - right lower extremity - p dermoid teratoma    Uterine leiomyoma    Screening for cervical cancer- repeat in 2025 - s/p LSH with right oophorectomy in 2009 - left ovary in place      Hyperlipidemia LDL goal <130    Elevated bilirubin - 1.6 from labs from 10/3/2022 - recheck 12/11/2023 = normal    Symptoms, such as flushing, sleeplessness, headache, lack of concentration, associated with the menopause    Trapezius muscle spasm-happened with pouring sand from 40lbs bag on 9/29/2022     Screen for colon cancer - negative cologuard 8/2023 - repeat in 2026 or do colonoscopy - recommended    Overweight (BMI 25.0-29.9)- pt  desires 10-15 lbs weight loss     Past Surgical History:   Procedure Laterality Date    HYSTERECTOMY, PAP STILL INDICATED  2009    lap. supracervical hyst with left ovary left in place     SURGICAL HISTORY OF -   at age 26    removal of right ovarian teratoma - atrophic right ovary still in place     SURGICAL HISTORY OF -   at age 13     strabismus surgery on both eyes     SURGICAL HISTORY OF -       endometriosis surgery - with some laser - laparoscopic        Social History     Tobacco Use    Smoking status: Never    Smokeless tobacco: Never   Substance Use Topics    Alcohol use: Yes     Comment: 2 to 3 drinks a year.     Family History   Problem Relation Age of Onset    Hypertension Mother     Heart Disease Mother     Lipids Mother     Thyroid Disease Mother     Cerebrovascular Disease Mother 80        first one at age 80, then 2 small ones after that     Hypertension Father     Cancer Father 80        stage IV sarcoma of left triceps at age 80  - removed at [facility]- 2008    Lung Cancer Father     Breast Cancer Father         did genetic counseling = negative.     No Known Problems Sister     Diabetes Maternal Grandfather     Cerebrovascular Disease Maternal Grandfather     Cerebrovascular Disease Paternal Grandfather     Lipids Sister     No Known Problems Brother     Cardiac Sudden Death Maternal Grandmother 52        massive MI at age 52     Myocardial Infarction Paternal Grandmother 88         from first MI while playing cards     No Known Problems Other          Current Outpatient Medications   Medication Sig Dispense Refill    cetirizine (ZYRTEC) 10 MG tablet Take 10 mg by mouth daily      estradiol (ESTRACE) 1 MG tablet TAKE 1 TABLET BY MOUTH EVERY DAY 90 tablet 0    meclizine (ANTIVERT) 25 MG tablet Take 1 tablet (25 mg) by mouth 3 times daily as needed for dizziness Vertigo 30 tablet 1    Multiple Vitamin (MULTI-VITAMIN) per tablet Take 1 tablet by mouth daily 100 tablet 12    simvastatin  (ZOCOR) 5 MG tablet Take 1 tablet (5 mg) by mouth At Bedtime 90 tablet 3    valACYclovir (VALTREX) 1000 mg tablet Take 1 tablet (1,000 mg) by mouth 3 times daily 21 tablet 3    VITAMIN D PO       Turmeric (QC TUMERIC COMPLEX PO)        No Known Allergies  Recent Labs   Lab Test 12/14/23  0802 11/04/22  1115 10/03/22  1454 07/12/22  0753 07/12/22  0753 04/29/21  0748   *  --  118*  --  118* 105*   HDL 60  --  62  --  62 58   TRIG 86  --  128  --  129 142   ALT 58* 20 19   < > 18 29   CR 0.78  --  0.82  --   --  0.78   GFRESTIMATED 88  --  83  --   --  85   GFRESTBLACK  --   --   --   --   --  >90   POTASSIUM 4.6  --  4.7  --   --  4.4   TSH 2.62  --  1.61  --   --  2.96    < > = values in this interval not displayed.        Breast Cancer Screening:    FHS-7:       4/16/2021     2:02 PM 11/5/2021     2:04 PM 9/26/2022    12:41 PM 1/13/2023     1:23 PM 12/20/2023    10:48 AM   Breast CA Risk Assessment (FHS-7)   Did any of your first-degree relatives have breast or ovarian cancer? Yes No Yes Yes Yes   Did any of your relatives have bilateral breast cancer? Unknown No No No No   Did any man in your family have breast cancer? Yes Yes Yes Yes Yes   Did any woman in your family have breast and ovarian cancer? No No No No No   Did any woman in your family have breast cancer before age 50 y? No No No No No   Do you have 2 or more relatives with breast and/or ovarian cancer? No No No No No   Do you have 2 or more relatives with breast and/or bowel cancer? No No No No No       Mammogram Screening: Recommended annual mammography  Pertinent mammograms are reviewed under the imaging tab.    History of abnormal Pap smear: NO - age 30- 65 PAP every 3 years recommended      Latest Ref Rng & Units 7/20/2020     9:30 AM 7/20/2020     9:21 AM 3/6/2019     2:15 PM   PAP / HPV   PAP (Historical)   NIL     HPV 16 DNA NEG^Negative Negative   Negative    HPV 18 DNA NEG^Negative Negative   Negative    Other HR HPV NEG^Negative  Negative   Negative      Reviewed and updated as needed this visit by clinical staff                  Reviewed and updated as needed this visit by Provider                 Past Medical History:   Diagnosis Date    Benign neoplasm of ovary at age 23    dermoid teratoma right ovary - s/p removal     Edema     seen at [place], since dermoid teratoma right ovary     Endometriosis of ovary     Female infertility of unspecified origin     has had one successful pregnancy     Fibroids 2009    large - resolved s/p supracervical hysterectomy     Routine gynecological examination     has yearly exams with Dr. Vaughn ob/gyn       Past Surgical History:   Procedure Laterality Date    EYE SURGERY  1970    HYSTERECTOMY, PAP STILL INDICATED  2009    lap. supracervical hyst with left ovary left in place     SURGICAL HISTORY OF -   at age 26    removal of right ovarian teratoma - atrophic right ovary still in place     SURGICAL HISTORY OF -   at age 13     strabismus surgery on both eyes     SURGICAL HISTORY OF -       endometriosis surgery - with some laser - laparoscopic      OB History    Para Term  AB Living   1 1 1 0 0 1   SAB IAB Ectopic Multiple Live Births   0 0 0 0 1      # Outcome Date GA Lbr Benny/2nd Weight Sex Delivery Anes PTL Lv   1 Term 00 40w0d  3.033 kg (6 lb 11 oz) F    LATHA      Birth Comments: pre-eclampsia       Name: Quentin       Obstetric Comments   Daughter - Patricia - is 20 yrs old - noted 2021 - no hx of gestational diabetes, but hx of pre-eclampsia        Review of Systems   Constitutional:  Negative for chills and fever.   HENT:  Negative for congestion, ear pain, hearing loss and sore throat.    Eyes:  Negative for pain and visual disturbance.   Respiratory:  Negative for cough and shortness of breath.    Cardiovascular:  Positive for peripheral edema. Negative for chest pain and palpitations.   Gastrointestinal:  Positive for constipation. Negative for abdominal  "pain, diarrhea, heartburn, hematochezia and nausea.   Breasts:  Negative for tenderness, breast mass and discharge.   Genitourinary:  Negative for dysuria, frequency, genital sores, hematuria, pelvic pain, urgency, vaginal bleeding and vaginal discharge.   Musculoskeletal:  Positive for joint swelling and myalgias. Negative for arthralgias.   Skin:  Negative for rash.   Neurological:  Positive for dizziness. Negative for weakness, headaches and paresthesias.   Psychiatric/Behavioral:  Positive for mood changes. The patient is nervous/anxious.           OBJECTIVE:   /70   Pulse 83   Temp 97.1  F (36.2  C)   Resp 16   Ht 1.651 m (5' 5\")   Wt 71.7 kg (158 lb)   LMP 03/19/2008   SpO2 99%   BMI 26.29 kg/m    Physical Exam  GENERAL APPEARANCE: healthy, alert and no distress  EYES: Eyes grossly normal to inspection, PERRL and conjunctivae and sclerae normal  HENT: ear canals and TM's normal, nose and mouth without ulcers or lesions, oropharynx clear and oral mucous membranes moist  NECK: no adenopathy, no asymmetry, masses, or scars and thyroid normal to palpation  RESP: lungs clear to auscultation - no rales, rhonchi or wheezes  BREAST: normal without masses, tenderness or nipple discharge and no palpable axillary masses or adenopathy  CV: regular rate and rhythm, normal S1 S2, no S3 or S4, no murmur, click or rub, no peripheral edema and peripheral pulses strong  ABDOMEN: soft, nontender, no hepatosplenomegaly, no masses and bowel sounds normal  MS: no musculoskeletal defects are noted and gait is age appropriate without ataxia  SKIN: no suspicious lesions or rashes  NEURO: Normal strength and tone, sensory exam grossly normal, mentation intact and speech normal  PSYCH: mentation appears normal and affect normal/bright    Diagnostic Test Results:  Labs reviewed in Epic    Lab on 12/14/2023   Component Date Value Ref Range Status    TSH 12/14/2023 2.62  0.30 - 4.20 uIU/mL Final    Cholesterol 12/14/2023 186  " <200 mg/dL Final    Triglycerides 12/14/2023 86  <150 mg/dL Final    Direct Measure HDL 12/14/2023 60  >=50 mg/dL Final    LDL Cholesterol Calculated 12/14/2023 109 (H)  <=100 mg/dL Final    Non HDL Cholesterol 12/14/2023 126  <130 mg/dL Final    Patient Fasting > 8hrs? 12/14/2023 Yes   Final    Sodium 12/14/2023 141  135 - 145 mmol/L Final    Reference intervals for this test were updated on 09/26/2023 to more accurately reflect our healthy population. There may be differences in the flagging of prior results with similar values performed with this method. Interpretation of those prior results can be made in the context of the updated reference intervals.     Potassium 12/14/2023 4.6  3.4 - 5.3 mmol/L Final    Carbon Dioxide (CO2) 12/14/2023 26  22 - 29 mmol/L Final    Anion Gap 12/14/2023 9  7 - 15 mmol/L Final    Urea Nitrogen 12/14/2023 16.2  6.0 - 20.0 mg/dL Final    Creatinine 12/14/2023 0.78  0.51 - 0.95 mg/dL Final    GFR Estimate 12/14/2023 88  >60 mL/min/1.73m2 Final    Calcium 12/14/2023 9.3  8.6 - 10.0 mg/dL Final    Chloride 12/14/2023 106  98 - 107 mmol/L Final    Glucose 12/14/2023 85  70 - 99 mg/dL Final    Alkaline Phosphatase 12/14/2023 51  40 - 150 U/L Final    Reference intervals for this test were updated on 11/14/2023 to more accurately reflect our healthy population. There may be differences in the flagging of prior results with similar values performed with this method. Interpretation of those prior results can be made in the context of the updated reference intervals.    AST 12/14/2023 50 (H)  0 - 45 U/L Final    Reference intervals for this test were updated on 6/12/2023 to more accurately reflect our healthy population. There may be differences in the flagging of prior results with similar values performed with this method. Interpretation of those prior results can be made in the context of the updated reference intervals.    ALT 12/14/2023 58 (H)  0 - 50 U/L Final    Reference intervals  for this test were updated on 6/12/2023 to more accurately reflect our healthy population. There may be differences in the flagging of prior results with similar values performed with this method. Interpretation of those prior results can be made in the context of the updated reference intervals.      Protein Total 12/14/2023 6.6  6.4 - 8.3 g/dL Final    Albumin 12/14/2023 4.3  3.5 - 5.2 g/dL Final    Bilirubin Total 12/14/2023 0.8  <=1.2 mg/dL Final    WBC Count 12/14/2023 4.9  4.0 - 11.0 10e3/uL Final    RBC Count 12/14/2023 4.15  3.80 - 5.20 10e6/uL Final    Hemoglobin 12/14/2023 13.1  11.7 - 15.7 g/dL Final    Hematocrit 12/14/2023 39.1  35.0 - 47.0 % Final    MCV 12/14/2023 94  78 - 100 fL Final    MCH 12/14/2023 31.6  26.5 - 33.0 pg Final    MCHC 12/14/2023 33.5  31.5 - 36.5 g/dL Final    RDW 12/14/2023 13.1  10.0 - 15.0 % Final    Platelet Count 12/14/2023 236  150 - 450 10e3/uL Final    Creatinine Urine mg/dL 12/14/2023 98.3  mg/dL Final    The reference ranges have not been established in urine creatinine. The results should be integrated into the clinical context for interpretation.    Albumin Urine mg/L 12/14/2023 <12.0  mg/L Final    The reference ranges have not been established in urine albumin. The results should be integrated into the clinical context for interpretation.    Albumin Urine mg/g Cr 12/14/2023    Final    Unable to calculate, urine albumin and/or urine creatinine is outside detectable limits.  Microalbuminuria is defined as an albumin:creatinine ratio of 17 to 299 for males and 25 to 299 for females. A ratio of albumin:creatinine of 300 or higher is indicative of overt proteinuria.  Due to biologic variability, positive results should be confirmed by a second, first-morning random or 24-hour timed urine specimen. If there is discrepancy, a third specimen is recommended. When 2 out of 3 results are in the microalbuminuria range, this is evidence for incipient nephropathy and warrants  increased efforts at glucose control, blood pressure control, and institution of therapy with an angiotensin-converting-enzyme (ACE) inhibitor (if the patient can tolerate it).         Discussed above with pt in detail today.     ASSESSMENT/PLAN:       ICD-10-CM    1. Routine general medical examination at a health care facility  Z00.00 PRIMARY CARE FOLLOW-UP SCHEDULING      2. Need for hepatitis B vaccination  Z23 HEPATITIS B, ADULT 20+ (ENGERIX-B/RECOMBIVAX HB)      3. Overweight (BMI 25.0-29.9)- pt desires 10-15 lbs weight loss  E66.3 Nutrition Referral      4. Elevated bilirubin - 1.6 from labs from 10/3/2022 - recheck 12/11/2023 = normal  R17       5. Hyperlipidemia LDL goal <130 - discussed lab results and needs refills  E78.5 simvastatin (ZOCOR) 5 MG tablet      6. Screening for cervical cancer- repeat in 2025 - s/p LSH with right oophorectomy in 2009 - left ovary in place    Z12.4       7. Symptoms, such as flushing, sleeplessness, headache, lack of concentration, associated with the menopause - discussed HRT - pt desires estradiol - s/p LSH in 2009  N95.1 estradiol (ESTRACE) 0.5 MG tablet      8. Screen for colon cancer - negative cologuard 8/2023 - repeat in 2026 or do colonoscopy - recommended  Z12.11       9. Hyperlipidemia LDL goal <130- on simvastatin 5mg nightly since 10/2020   E78.5 simvastatin (ZOCOR) 5 MG tablet      10. Herpes zoster with complication - stable - needs refills on meds  B02.8 valACYclovir (VALTREX) 1000 mg tablet      11. Visit for screening mammogram  Z12.31 MA Screening Bilateral w/ Pepe        Please, call our clinic or go to the ER immediately if signs or symptoms worsen or fail to improve as anticipated.     Patient has been advised of split billing requirements and indicates understanding: Yes      COUNSELING:  Reviewed preventive health counseling, as reflected in patient instructions    Return in about 1 year (around 12/20/2024) for cholesterol, Wellness/Preventative Visit,  katia/ Dr. MAYA for 40 min appt.     She reports that she has never smoked. She has never used smokeless tobacco.             Steph Gutierrez MD  [facility] [place]

## 2024-01-29 ENCOUNTER — HOSPITAL ENCOUNTER (OUTPATIENT)
Dept: MAMMOGRAPHY | Facility: CLINIC | Age: 59
Discharge: HOME | End: 2024-01-29
Attending: FAMILY MEDICINE | Admitting: FAMILY MEDICINE
Payer: COMMERCIAL

## 2024-01-29 DIAGNOSIS — Z12.31 VISIT FOR SCREENING MAMMOGRAM: ICD-10-CM

## 2024-01-29 PROCEDURE — 77063 BREAST TOMOSYNTHESIS BI: CPT

## 2024-02-06 NOTE — RESULT ENCOUNTER NOTE
Your mammogram was normal.     Thank you so much for choosing [facility].  Please contact us with any questions that you may have.   We appreciate the opportunity to serve you now and look forward to supporting your healthcare needs for a long time to come!    Most Sincerely,     Steph Gutierrez MD

## 2024-02-08 ENCOUNTER — MYC MEDICAL ADVICE (OUTPATIENT)
Dept: FAMILY MEDICINE | Facility: CLINIC | Age: 59
End: 2024-02-08
Payer: COMMERCIAL

## 2024-02-14 ENCOUNTER — VIRTUAL VISIT (OUTPATIENT)
Dept: FAMILY MEDICINE | Facility: CLINIC | Age: 59
End: 2024-02-14
Payer: COMMERCIAL

## 2024-02-14 DIAGNOSIS — N95.1 SYMPTOMS, SUCH AS FLUSHING, SLEEPLESSNESS, HEADACHE, LACK OF CONCENTRATION, ASSOCIATED WITH THE MENOPAUSE: ICD-10-CM

## 2024-02-14 DIAGNOSIS — N95.1 VASOMOTOR SYMPTOMS DUE TO MENOPAUSE: Primary | ICD-10-CM

## 2024-02-14 PROCEDURE — 99214 OFFICE O/P EST MOD 30 MIN: CPT | Mod: 95 | Performed by: FAMILY MEDICINE

## 2024-02-14 RX ORDER — ESTRADIOL 0.05 MG/D
1 PATCH, EXTENDED RELEASE TRANSDERMAL
Qty: 24 PATCH | Refills: 3 | Status: SHIPPED | OUTPATIENT
Start: 2024-02-15

## 2024-02-14 NOTE — PATIENT INSTRUCTIONS
" [facility]  [address] [place] [address]  Office: [phone]   Fax:    [phone]     Transdermal estrogen administration is associated with a lower risk of venous thrombosis and stroke, and it has less of an effect on serum lipid concentrations when compared with a comparable dose of oral estrogen.     A transdermal dose of 0.05 mg/day  is approximately equivalent to 1 mg of oral 17-beta estradiol (the estradiol oral formulation that you are on right now) and a 0.625 mg daily oral dose of conjugated estrogens  (which is premarin).     So I've put in an rx for the Vivelle-Dot - 0.05mg/24 hour  bi-weekly patch to    [facility] [id number] [facility] [place] - [address] - per your request - we'll try 3 months at a time.     Please, call our clinic or go to the ER immediately if signs or symptoms worsen or fail to improve as anticipated.             Thank you so much for doing a video visit with us today. And, again, thank you  for choosing our [facility] [place].  Please contact us with any questions that you may have.   We appreciate the opportunity to serve you now and look forward to supporting your healthcare needs for a long time to come!    Our clinic for the foreseeable future and until further notice , will continue to offer virtual visits, including telephone visits, video visits,  and e-visits, in addition to in person visits,  especially during this period of COVID-19 coronavirus pandemic.  Please call to schedule another one if you need it!        Most Sincerely,     Steph Gutierrez MD                                              To reach your [facility] care team after hours call:   [phone] press #[phone] \"to speak with your care team\".  This will get you to our clinic instead of routing to central [facility]  scheduling.     PLEASE NOTE OUR HOURS HAVE CHANGED secondary to COVID-19 coronavirus pandemic, " as we are trying to minimize patient exposure to the virus,  which is now widespread in the state.  These hours may change with very little notice.  We apologize for any inconvenience.       Our current clinic hours are:         Monday- Thursday   7:00am - 6:00pm  in person.      Friday  7:00am- 5:00pm in person                       Saturday and Sunday : Closed to in person and virtual visits            We have telephone and virtual visit times available between 7:00am - 6pm on             Monday-Friday as well.                                                Phone:  [phone]    Our pharmacy hours: Monday  through Friday 8:00am to 5:00pm                        Saturday - 9:00 am to 12 noon         Sunday : Closed.     ###  Please note: at this time we are not accepting any walk-in visits. ###      There is also information available at our web site:  [email or web address]    If your provider ordered any lab tests and you do not receive the results within 10 business days, please call the clinic.    If you need a medication refill please contact your pharmacy.  Please allow 3 business days for your refill to be completed.    Our clinic offers telephone visits and e visits.  Please ask one of your team members to explain more.      Use [employer] (secure email communication and access to your chart) to send your primary care provider a message or make an appointment. Ask someone on your Team how to sign up for [email or web address].

## 2024-02-14 NOTE — PROGRESS NOTES
[facility]  [address] [place] [id number]  Office: [phone]   Fax:    [phone]    Coby is a 58 year old who is being evaluated via a billable video visit.      How would you like to obtain your AVS? MyChart  If the video visit is dropped, the invitation should be resent by: Text to cell phone: [phone]  Will anyone else be joining your video visit? No          Assessment & Plan :       ICD-10-CM    1. Vasomotor symptoms due to menopause- hot flashes and night sweats  N95.1 estradiol (VIVELLE-DOT) 0.05 MG/24HR bi-weekly patch      2. Symptoms, such as flushing, sleeplessness, headache, lack of concentration, associated with the menopause - discussed ERT - pt desires estradiol patch now  - s/p [facility] in 2009  N95.1 estradiol (VIVELLE-DOT) 0.05 MG/24HR bi-weekly patch         Return in 10 months (on 12/20/2024) for Wellness/Preventative Visit, cholesterol, w/ Dr. MAYA for 40 min appt or sooner for menopausal sx's .     transdermal dose of 0.05 mg/day  is approximately equivalent to 1 mg of oral 17-beta estradiol (the estradiol oral formulation that you are on right now) and a 0.625 mg daily oral dose of conjugated estrogens  (which is premarin).     Since pt's 0.05mg oral estradiol not controlling her symptoms , she felt much better on 1mg oral estradiol daily.   So I've put in an rx for the Vivelle-Dot - 0.05mg/24 hour  bi-weekly patch to    [facility] [id number] [place] [facility] - [place], [place] - [address] - per your request - we'll try 3 months at a time.     Please, call our clinic or go to the ER immediately if signs or symptoms worsen or fail to improve as anticipated.     Pt's mammogram up to date  - done 1/29/2024.  Last breast exam done with me 12/20/2024.     Prescription drug management  32 minutes spent by me on the date of the encounter doing chart review, history and exam, documentation and further activities per the note       BMI:   Estimated body mass index is 26.29 kg/m   "as calculated from the following:    Height as of 12/20/23: 1.651 m (5' 5\").    Weight as of 12/20/23: 71.7 kg (158 lb).   Weight management plan: Discussed healthy diet and exercise guidelines    MEDICATIONS:   Orders Placed This Encounter   Medications    estradiol (VIVELLE-DOT) 0.05 MG/24HR bi-weekly patch     Sig: Place 1 patch onto the skin twice a week     Dispense:  24 patch     Refill:  3          - Continue other medications without change  Work on weight loss  Regular exercise  See Patient Instructions    Return in 10 months (on 12/20/2024) for Wellness/Preventative Visit, cholesterol, w/ Dr. MAYA for 40 min appt or sooner for menopausal sx's .    Future Appointments 2/14/2024 - 8/12/2024      None                   Steph Gutierrez MD  [facility] [place]        Subjective :   Coby Poe 58 year old female , presenting for the following health issues:  med change    1. Vasomotor symptoms due to menopause- hot flashes and night sweats    2. Symptoms, such as flushing, sleeplessness, headache, lack of concentration, associated with the menopause - discussed ERT - pt desires estradiol patch now  - s/p [facility] in 2009       History of Present Illness       Reason for visit:  Discuss medication (patient is on estradiol 0.5 mg tab and would like to switch to a patch)    She eats 2-3 servings of fruits and vegetables daily.She consumes 1 sweetened beverage(s) daily.She exercises with enough effort to increase her heart rate 30 to 60 minutes per day.  She exercises with enough effort to increase her heart rate 6 days per week. She is missing 1 dose(s) of medications per week.  She is not taking prescribed medications regularly due to remembering to take.     Having more hot flashes worse at night and night sweats as well - things are manageable, but she just wonders if another route might be better.   We decreased her oral estradiol from 1mg daily to 0.5mg daily 12/20/2023 and her night sweats " "are awakening her from sleep much more often and would like to go back on the 1mg dose equivalent patch.   Started on ERT about 5+ years ago s/p her hysterectomy.   Pt is s/p hysterectomy in 2009 at 43 yrs old - LSH with left ovary remaining in place.     Pt concerned with stroke risk as her mom had a stroke not too long ago.      Per Up-to-Date, our current on-line medical reference, which is updated regularly,  \"Transdermal estrogen administration is associated with a lower risk of venous thrombosis and stroke, and it has less of an effect on serum lipid concentrations when compared with a comparable dose of oral estrogen.     A transdermal dose of 0.05 mg/day  is approximately equivalent to 1 mg of oral 17-beta estradiol (the estradiol oral formulation that you are on right now) and a 0.625 mg daily oral dose of conjugated estrogens  (which is premarin). \"  - discussed that with pt today.           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 Vaughn -ob/gyn     Other chronic serous otitis media    EDEMA - right lower extremity - p dermoid teratoma    Uterine leiomyoma    Screening for cervical cancer- repeat in 2025 - s/p LSH with right oophorectomy in 2009 - left ovary in place      Hyperlipidemia LDL goal <130    Elevated bilirubin - 1.6 from labs from 10/3/2022 - recheck 12/11/2023 = normal    Symptoms, such as flushing, sleeplessness, headache, lack of concentration, associated with the menopause - discussed HRT - pt desires estradiol - s/p LSH in 2009    Trapezius muscle spasm-happened with pouring sand from 40lbs bag on 9/29/2022     Screen for colon cancer - negative cologuard 8/2023 - repeat in 2026 or do colonoscopy - recommended    Overweight (BMI 25.0-29.9)- pt desires 10-15 lbs weight loss       Current Outpatient Medications   Medication Sig Dispense Refill    cetirizine " (ZYRTEC) 10 MG tablet Take 10 mg by mouth daily      estradiol (ESTRACE) 0.5 MG tablet Take 1 tablet (0.5 mg) by mouth daily 90 tablet 3    meclizine (ANTIVERT) 25 MG tablet Take 1 tablet (25 mg) by mouth 3 times daily as needed for dizziness Vertigo 30 tablet 1    Multiple Vitamin (MULTI-VITAMIN) per tablet Take 1 tablet by mouth daily 100 tablet 12    simvastatin (ZOCOR) 5 MG tablet Take 1 tablet (5 mg) by mouth at bedtime 90 tablet 3    Turmeric (QC TUMERIC COMPLEX PO)       valACYclovir (VALTREX) 1000 mg tablet Take 1 tablet (1,000 mg) by mouth 3 times daily 21 tablet 3    VITAMIN D PO           No Known Allergies       Review of Systems :   Constitutional, HEENT, cardiovascular, pulmonary, GI, , musculoskeletal, neuro, skin, endocrine and psych systems are negative, except as otherwise noted.      Objective      Vitals:  No vitals were obtained today due to virtual visit.      GENERAL: alert and no distress  EYES: Eyes grossly normal to inspection.  No discharge or erythema, or obvious scleral/conjunctival abnormalities.  RESP: No audible wheeze, cough, or visible cyanosis.    SKIN: Visible skin clear. No significant rash, abnormal pigmentation or lesions.  NEURO: Cranial nerves grossly intact.  Mentation and speech appropriate for age.  PSYCH: Appropriate affect, tone, and pace of words    Lab on 12/14/2023   Component Date Value Ref Range Status    TSH 12/14/2023 2.62  0.30 - 4.20 uIU/mL Final    Cholesterol 12/14/2023 186  <200 mg/dL Final    Triglycerides 12/14/2023 86  <150 mg/dL Final    Direct Measure HDL 12/14/2023 60  >=50 mg/dL Final    LDL Cholesterol Calculated 12/14/2023 109 (H)  <=100 mg/dL Final    Non HDL Cholesterol 12/14/2023 126  <130 mg/dL Final    Patient Fasting > 8hrs? 12/14/2023 Yes   Final    Sodium 12/14/2023 141  135 - 145 mmol/L Final    Reference intervals for this test were updated on 09/26/2023 to more accurately reflect our healthy population. There may be differences in the  flagging of prior results with similar values performed with this method. Interpretation of those prior results can be made in the context of the updated reference intervals.     Potassium 12/14/2023 4.6  3.4 - 5.3 mmol/L Final    Carbon Dioxide (CO2) 12/14/2023 26  22 - 29 mmol/L Final    Anion Gap 12/14/2023 9  7 - 15 mmol/L Final    Urea Nitrogen 12/14/2023 16.2  6.0 - 20.0 mg/dL Final    Creatinine 12/14/2023 0.78  0.51 - 0.95 mg/dL Final    GFR Estimate 12/14/2023 88  >60 mL/min/1.73m2 Final    Calcium 12/14/2023 9.3  8.6 - 10.0 mg/dL Final    Chloride 12/14/2023 106  98 - 107 mmol/L Final    Glucose 12/14/2023 85  70 - 99 mg/dL Final    Alkaline Phosphatase 12/14/2023 51  40 - 150 U/L Final    Reference intervals for this test were updated on 11/14/2023 to more accurately reflect our healthy population. There may be differences in the flagging of prior results with similar values performed with this method. Interpretation of those prior results can be made in the context of the updated reference intervals.    AST 12/14/2023 50 (H)  0 - 45 U/L Final    Reference intervals for this test were updated on 6/12/2023 to more accurately reflect our healthy population. There may be differences in the flagging of prior results with similar values performed with this method. Interpretation of those prior results can be made in the context of the updated reference intervals.    ALT 12/14/2023 58 (H)  0 - 50 U/L Final    Reference intervals for this test were updated on 6/12/2023 to more accurately reflect our healthy population. There may be differences in the flagging of prior results with similar values performed with this method. Interpretation of those prior results can be made in the context of the updated reference intervals.      Protein Total 12/14/2023 6.6  6.4 - 8.3 g/dL Final    Albumin 12/14/2023 4.3  3.5 - 5.2 g/dL Final    Bilirubin Total 12/14/2023 0.8  <=1.2 mg/dL Final    WBC Count 12/14/2023 4.9  4.0 -  11.0 10e3/uL Final    RBC Count 12/14/2023 4.15  3.80 - 5.20 10e6/uL Final    Hemoglobin 12/14/2023 13.1  11.7 - 15.7 g/dL Final    Hematocrit 12/14/2023 39.1  35.0 - 47.0 % Final    MCV 12/14/2023 94  78 - 100 fL Final    MCH 12/14/2023 31.6  26.5 - 33.0 pg Final    MCHC 12/14/2023 33.5  31.5 - 36.5 g/dL Final    RDW 12/14/2023 13.1  10.0 - 15.0 % Final    Platelet Count 12/14/2023 236  150 - 450 10e3/uL Final    Creatinine Urine mg/dL 12/14/2023 98.3  mg/dL Final    The reference ranges have not been established in urine creatinine. The results should be integrated into the clinical context for interpretation.    Albumin Urine mg/L 12/14/2023 <12.0  mg/L Final    The reference ranges have not been established in urine albumin. The results should be integrated into the clinical context for interpretation.    Albumin Urine mg/g Cr 12/14/2023    Final    Unable to calculate, urine albumin and/or urine creatinine is outside detectable limits.  Microalbuminuria is defined as an albumin:creatinine ratio of 17 to 299 for males and 25 to 299 for females. A ratio of albumin:creatinine of 300 or higher is indicative of overt proteinuria.  Due to biologic variability, positive results should be confirmed by a second, first-morning random or 24-hour timed urine specimen. If there is discrepancy, a third specimen is recommended. When 2 out of 3 results are in the microalbuminuria range, this is evidence for incipient nephropathy and warrants increased efforts at glucose control, blood pressure control, and institution of therapy with an angiotensin-converting-enzyme (ACE) inhibitor (if the patient can tolerate it).           Video-Visit Details    Type of service:  Video Visit   Start time: 5:19pm  Stop time on video: 5:40pm  Total time on video with pt: 23 minutes.     Originating Location (pt. Location): Home    Distant Location (provider location):  On-site  Platform used for Video Visit: Pily  Signed Electronically by:  Steph Gutierrez MD

## 2024-03-14 ENCOUNTER — ALLIED HEALTH/NURSE VISIT (OUTPATIENT)
Dept: FAMILY MEDICINE | Facility: CLINIC | Age: 59
End: 2024-03-14
Payer: COMMERCIAL

## 2024-03-14 DIAGNOSIS — Z23 ENCOUNTER FOR IMMUNIZATION: Primary | ICD-10-CM

## 2024-03-14 PROCEDURE — 90471 IMMUNIZATION ADMIN: CPT

## 2024-03-14 PROCEDURE — 90746 HEPB VACCINE 3 DOSE ADULT IM: CPT

## 2024-03-14 PROCEDURE — 99207 PR NO CHARGE NURSE ONLY: CPT

## 2024-03-14 NOTE — PROGRESS NOTES
Prior to immunization administration, verified patients identity using patient s name and date of birth. Please see Immunization Activity for additional information.     Screening Questionnaire for Adult Immunization    Are you sick today?   No   Do you have allergies to medications, food, a vaccine component or latex?   No   Have you ever had a serious reaction after receiving a vaccination?   No   Do you have a long-term health problem with heart, lung, kidney, or metabolic disease (e.g., diabetes), asthma, a blood disorder, no spleen, complement component deficiency, a cochlear implant, or a spinal fluid leak?  Are you on long-term aspirin therapy?   No   Do you have cancer, leukemia, HIV/AIDS, or any other immune system problem?   No   Do you have a parent, brother, or sister with an immune system problem?   No   In the past 3 months, have you taken medications that affect  your immune system, such as prednisone, other steroids, or anticancer drugs; drugs for the treatment of rheumatoid arthritis, Crohn s disease, or psoriasis; or have you had radiation treatments?   No   Have you had a seizure, or a brain or other nervous system problem?   No   During the past year, have you received a transfusion of blood or blood    products, or been given immune (gamma) globulin or antiviral drug?   No   For women: Are you pregnant or is there a chance you could become       pregnant during the next month?   No   Have you received any vaccinations in the past 4 weeks?   No     Immunization questionnaire answers were all negative.    I have reviewed the following standing orders:   This patient is due and qualifies for the Hepatitis B vaccine.    Click here for Hepatitis B Standing Order    I have reviewed the vaccines inclusion and exclusion criteria; No concerns regarding eligibility.     Patient instructed to remain in clinic for 15 minutes afterwards, and to report any adverse reactions.     Screening performed by Briana  NEERAJ Dimas LPN on 3/14/2024 at 1:27 PM.

## 2024-03-22 ENCOUNTER — NURSE TRIAGE (OUTPATIENT)
Dept: NURSING | Facility: CLINIC | Age: 59
End: 2024-03-22

## 2024-03-22 ENCOUNTER — OFFICE VISIT (OUTPATIENT)
Dept: FAMILY MEDICINE | Facility: CLINIC | Age: 59
End: 2024-03-22
Payer: COMMERCIAL

## 2024-03-22 VITALS
TEMPERATURE: 96.7 F | WEIGHT: 156.2 LBS | RESPIRATION RATE: 16 BRPM | HEART RATE: 85 BPM | SYSTOLIC BLOOD PRESSURE: 128 MMHG | HEIGHT: 65 IN | DIASTOLIC BLOOD PRESSURE: 82 MMHG | OXYGEN SATURATION: 97 % | BODY MASS INDEX: 26.02 KG/M2

## 2024-03-22 DIAGNOSIS — L60.0 INGROWN TOENAIL OF RIGHT FOOT: Primary | ICD-10-CM

## 2024-03-22 PROCEDURE — 99213 OFFICE O/P EST LOW 20 MIN: CPT | Performed by: NURSE PRACTITIONER

## 2024-03-22 RX ORDER — CEPHALEXIN 500 MG/1
500 CAPSULE ORAL 3 TIMES DAILY
Qty: 30 CAPSULE | Refills: 0 | Status: SHIPPED | OUTPATIENT
Start: 2024-03-22 | End: 2024-04-01

## 2024-03-22 NOTE — TELEPHONE ENCOUNTER
Nurse Triage SBAR    Is this a 2nd Level Triage? NO    Situation:   Toe pain    Background:   Months ago, she had an xray done on the right foot as her toe was always red.  Pt states that xray was normal.    Assessment:   Pt thinks she might have an ingrown toenail.  No redness but has  throbbing pain in the right side of her right great toe.   For 2 days, she was doing warm salt soaks.   She thought it was improving but she had to wear certain shoes to work and now it looks like a blister and is more painful.  She did take some ibuprofen.    Protocol Recommended Disposition:   See PCP Within 24 Hours    Recommendation:   Advised pt to be seen in clinic.  Care advice given.  Informed of urgent care option if no appts available.  Pt verbalized understanding.    Kristen Smith, RN, BSN Nurse Triage Advisor 3/22/2024 2:53 AM        Reason for Disposition   Looks like a boil, infected sore, or deep ulcer    Additional Information   Negative: Foot is cool or blue in comparison to other foot   Negative: Purple or black skin on toe  (Exception: Person remembers bruising the toe from an injury.)   Negative: [1] Looks infected (spreading redness, red streak, pus) AND [2] fever   Negative: Patient sounds very sick or weak to the triager   Negative: [1] Redness spreading into foot or red streak into foot AND [2] no fever   Negative: [1] SEVERE pain (e.g., excruciating, unable to do any normal activities) AND [2] not improved after 2 hours of pain medicine    Protocols used: Toe Pain-A-

## 2024-03-22 NOTE — PROGRESS NOTES
"  Assessment & Plan     Ingrown toenail of right foot  Treat as below. If ongoing consider toenail removal.   - cephALEXin (KEFLEX) 500 MG capsule  Dispense: 30 capsule; Refill: 0  - Orthopedic  Referral        See Patient Instructions    Christopher Tenorio is a 59 year old, presenting for the following health issues:  Toenail (Right great toe )        3/22/2024     7:13 AM   Additional Questions   Roomed by Nancy MARIANO     Toenail    History of Present Illness       Reason for visit:  Ingrown toe infection  Symptom onset:  1-2 weeks ago    She eats 2-3 servings of fruits and vegetables daily.She consumes 0 sweetened beverage(s) daily.She exercises with enough effort to increase her heart rate 30 to 60 minutes per day.  She exercises with enough effort to increase her heart rate 4 days per week.   She is taking medications regularly.     Right great toenail and right great toe is red, swollen.  Started 2 weeks ago but has worsened.  She has tried epsom salt soaking and antibiotic ointment with no relief.          Review of Systems  Constitutional, neuro, ENT, endocrine, pulmonary, cardiac, gastrointestinal, genitourinary, musculoskeletal, integument and psychiatric systems are negative, except as otherwise noted.      Objective    /82   Pulse 85   Temp (!) 96.7  F (35.9  C) (Tympanic)   Resp 16   Ht 1.645 m (5' 4.75\")   Wt 70.9 kg (156 lb 3.2 oz)   LMP 03/19/2008   SpO2 97%   BMI 26.19 kg/m    Body mass index is 26.19 kg/m .  Physical Exam   GENERAL: alert and no distress  SKIN: ingrown toenail right great toe. Lateral nailbed inflamed, erythmatous tender to touch. Toe itself appears swollen and pink.            Signed Electronically by: Lula Forbes CNP    "

## 2024-04-03 ENCOUNTER — OFFICE VISIT (OUTPATIENT)
Dept: PODIATRY | Facility: CLINIC | Age: 59
End: 2024-04-03
Attending: NURSE PRACTITIONER
Payer: COMMERCIAL

## 2024-04-03 VITALS — SYSTOLIC BLOOD PRESSURE: 112 MMHG | DIASTOLIC BLOOD PRESSURE: 62 MMHG | BODY MASS INDEX: 26.16 KG/M2 | WEIGHT: 156 LBS

## 2024-04-03 DIAGNOSIS — L60.0 INGROWN TOENAIL OF RIGHT FOOT: ICD-10-CM

## 2024-04-03 DIAGNOSIS — M79.671 RIGHT FOOT PAIN: Primary | ICD-10-CM

## 2024-04-03 PROCEDURE — 11730 AVULSION NAIL PLATE SIMPLE 1: CPT | Mod: T5 | Performed by: PODIATRIST

## 2024-04-03 PROCEDURE — 99203 OFFICE O/P NEW LOW 30 MIN: CPT | Mod: 25 | Performed by: PODIATRIST

## 2024-04-03 NOTE — PATIENT INSTRUCTIONS
Thank you for choosing [facility]!    DR MUÑOZ'S CLINIC:  [facility]   [address]   [place] [address]      TRIAGE LINE: [phone]  APPOINTMENTS: [phone]  RADIOLOGY: [phone]  SET UP SURGERY: [phone]  PHYSICAL THERAPY: [phone]   FAX NUMBER: [phone]  BILLING QUESTIONS: [phone]       Follow up: As needed      INGROWN TOENAILS  When a toenail is ingrown, it is curved and grows into the skin, usually at the nail borders (the sides of the nail). This  digging in  of the nail irritates the skin, often creating pain, redness, swelling, and warmth in the toe.  If an ingrown nail causes a break in the skin, bacteria may enter and cause an infection in the area, which is often marked by drainage and a foul odor. However, even if the toe isn t painful, red, swollen, or warm, a nail that curves downward into the skin can progress to an infection.  CAUSES:  Heredity: In many people, the tendency for ingrown toenails is inherited.   Trauma: Sometimes an ingrown toenail is the result of trauma, such as stubbing your toe, having an object fall on your toe, or engaging in activities that involve repeated pressure on the toes, such as kicking or running.   Improper Trimming:  The most common cause of ingrown toenails is cutting your nails too short. This encourages the skin next to the nail to fold over the nail.   Improperly Sized Footwear: Ingrown toenails can result from wearing socks and shoes that are tight or short.   Nail Conditions: Ingrown toenails can be caused by nail problems, such as fungal infections or losing a nail due to trauma.   TREATMENT: Sometimes initial treatment for ingrown toenails can be safely performed at home. However, home treatment is strongly discouraged if an infection is suspected, or for those who have medical conditions that put feet at high risk, such as diabetes, nerve damage in the foot, or poor  circulation.  Home care: If you don t have an infection or any of the above medical conditions, you can soak your foot in room-temperature water (adding Epsom s salt may be recommended by your doctor), and gently massage the side of the nail fold to help reduce the inflammation.  Avoid attempting  bathroom surgery.  Repeated cutting of the nail can cause the condition to worsen over time. If your symptoms fail to improve, it s time to see a foot and ankle surgeon.  Physician care: After examining the toe, the foot and ankle surgeon will select the treatment best suited for you. If an infection is present, an oral antibiotic may be prescribed.  Sometimes a minor surgical procedure, often performed in the office, will ease the pain and remove the offending nail. After applying a local anesthetic, the doctor removes part of the nail s side border. Some nails may become ingrown again, requiring removal of the nail root.  Following the nail procedure, a light bandage will be applied. Most people experience very little pain after surgery and may resume normal activity the next day. If your surgeon has prescribed an oral antibiotic, be sure to take all the medication, even if your symptoms have improved.  PREVENTION:  Proper Trimming: Cut toenails in a fairly straight line, and don t cut them too short. You should be able to get your fingernail under the sides and end of the nail.   Well-fitting Footwear: Don t wear shoes that are short or tight in the toe area. Avoid shoes that are loose, because they too cause pressure on the toes, especially when running or walking briskly.     INGROWN TOENAIL REMOVAL AFTERCARE   Go directly home and elevate the affected foot on one or two pillows for the remainder of the day/evening if possible. Your toe may stay numb anywhere from 2-8 hours.   Take Tylenol, ibuprofen or another anti-inflammatory as needed for pain.   Take antibiotic if that has been prescribed. Finish the entire  prescribed antibiotic even if your symptoms have improved.   The evening of the procedure, soak/wash the affected area in warm water (you may add Epsom salt) for 5 to 10 minutes. Do this twice a day for 2-4 weeks (6-8 weeks if you had phenol) (you may count showering/bathing as one soak).  After soaks, pat the area dry and then allow to airdry for a few minutes. Apply antibiotic ointment to the area and cover with 2 X 2 gauze and paper tape or band-aid.  You may pursue everyday activities as tolerated with either an open toe shoe or cut-out shoe as needed or you may wear regular shoes if no pain is noted.  Watch for any signs and symptoms of infection such as: redness, red streaks going up the foot/leg, swelling, pus or foul odor. Those that have had the phenol procedure, the toe will drain longer and will look like it is infected because it is a chemical burn.   Please call with questions.

## 2024-04-03 NOTE — PROGRESS NOTES
PATIENT HISTORY:  Deanna Forbes CNP requested I see this patient for their foot issue.  Coby Poe is a 59 year old female who presents to clinic for ingrown to right great toenail.  Notes it has been sore for the last few months.  States it was infected and she was on antibiotics and is doing better but it still painful at the tip.  Can be 5 out of 10 at its worst.  Worse with pressure.  Wondering what can be done for it.    Review of Systems:  Patient denies fever, chills, rash, wound, stiffness, limping, numbness, weakness, heart burn, blood in stool, chest pain with activity, calf pain when walking, shortness of breath with activity, chronic cough, easy bleeding/bruising, swelling of ankles, excessive thirst, fatigue, depression, anxiety. .     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 Provider to review    Female infertility of unspecified origin     has had one successful pregnancy     Fibroids 01/01/2009    large - resolved s/p supracervical hysterectomy     Routine gynecological examination     has yearly exams with Dr. Vaughn ob/gyn         PAST SURGICAL HISTORY:   Past Surgical History:   Procedure Laterality Date    EYE SURGERY  1970    HYSTERECTOMY, PAP STILL INDICATED  01/01/2009    lap. supracervical hyst with left ovary left in place     SURGICAL HISTORY OF -   at age 26    removal of right ovarian teratoma - atrophic right ovary still in place     SURGICAL HISTORY OF -   at age 13     strabismus surgery on both eyes     SURGICAL HISTORY OF -       endometriosis surgery - with some laser - laparoscopic         MEDICATIONS:   Current Outpatient Medications:     cetirizine (ZYRTEC) 10 MG tablet,  Take 10 mg by mouth daily, Disp: , Rfl:     estradiol (VIVELLE-DOT) 0.05 MG/24HR bi-weekly patch, Place 1 patch onto the skin twice a week, Disp: 24 patch, Rfl: 3    meclizine (ANTIVERT) 25 MG tablet, Take 1 tablet (25 mg) by mouth 3 times daily as needed for dizziness Vertigo, Disp: 30 tablet, Rfl: 1    Multiple Vitamin (MULTI-VITAMIN) per tablet, Take 1 tablet by mouth daily, Disp: 100 tablet, Rfl: 12    simvastatin (ZOCOR) 5 MG tablet, Take 1 tablet (5 mg) by mouth at bedtime, Disp: 90 tablet, Rfl: 3    Turmeric (QC TUMERIC COMPLEX PO), , Disp: , Rfl:     valACYclovir (VALTREX) 1000 mg tablet, Take 1 tablet (1,000 mg) by mouth 3 times daily, Disp: 21 tablet, Rfl: 3    VITAMIN D PO, , Disp: , Rfl:      ALLERGIES:  No Known Allergies     SOCIAL HISTORY:   Social History     Socioeconomic History    Marital status:      Spouse name: Ish     Number of children: 1    Years of education: 18    Highest education level: Not on file   Occupational History    Occupation: stay at home mom      Employer: NONE    Tobacco Use    Smoking status: Never    Smokeless tobacco: Never   Vaping Use    Vaping Use: Never used   Substance and Sexual Activity    Alcohol use: Not Currently     Comment: 2 to 3 drinks a year.    Drug use: No     Comment: no herbal meds either     Sexual activity: Yes     Partners: Male     Birth control/protection: Surgical     Comment: hysterectomy   Other Topics Concern     Service No    Blood Transfusions No    Caffeine Concern Yes     Comment: 2 cups of coffee daily , diet coke occasionally     Occupational Exposure Not Asked    Hobby Hazards Not Asked    Sleep Concern Not Asked    Stress Concern Not Asked    Weight Concern Not Asked    Special Diet Not Asked    Back Care Not Asked    Exercise Yes     Comment: treadmill and running around [place] - encouraged weight training 3x/week      Bike Helmet Not Asked    Seat Belt Yes     Comment: always     Self-Exams Yes     Comment: SBE  encouraged monthly     Parent/sibling w/ CABG, MI or angioplasty before 65F 55M? Yes     Comment: mother angioplasty   Social History Narrative    Not on file     Social Determinants of Health     Financial Resource Strain: Low Risk  (2023)    Financial Resource Strain     Within the past 12 months, have you or your family members you live with been unable to get utilities (heat, electricity) when it was really needed?: No   Food Insecurity: Low Risk  (2023)    Food Insecurity     Within the past 12 months, did you worry that your food would run out before you got money to buy more?: No     Within the past 12 months, did the food you bought just not last and you didn t have money to get more?: No   Transportation Needs: Low Risk  (2023)    Transportation Needs     Within the past 12 months, has lack of transportation kept you from medical appointments, getting your medicines, non-medical meetings or appointments, work, or from getting things that you need?: No   Physical Activity: Not on file   Stress: Not on file   Social Connections: Not on file   Interpersonal Safety: Low Risk  (2023)    Interpersonal Safety     Do you feel physically and emotionally safe where you currently live?: Yes     Within the past 12 months, have you been hit, slapped, kicked or otherwise physically hurt by someone?: No     Within the past 12 months, have you been humiliated or emotionally abused in other ways by your partner or ex-partner?: No   Housing Stability: Low Risk  (2023)    Housing Stability     Do you have housing? : Yes     Are you worried about losing your housing?: No        FAMILY HISTORY:   Family History   Problem Relation Age of Onset    Hypertension Mother             Heart Disease Mother     Lipids Mother     Thyroid Disease Mother     Cerebrovascular Disease Mother 80        first one at age 80, then 2 small ones after that     Hyperlipidemia Mother     Hypertension Father              Cancer Father 80        stage IV sarcoma of left triceps at age 80  - removed at [facility]- 2008    Lung Cancer Father     Breast Cancer Father         did genetic counseling = negative.     No Known Problems Sister     Diabetes Maternal Grandfather     Cerebrovascular Disease Maternal Grandfather     Cerebrovascular Disease Paternal Grandfather     Lipids Sister     No Known Problems Brother     Cardiac Sudden Death Maternal Grandmother 52        massive MI at age 52     Myocardial Infarction Paternal Grandmother 88         from first MI while playing cards     No Known Problems Other         EXAM:Vitals: Wt 70.8 kg (156 lb)   LMP 2008   BMI 26.16 kg/m    BMI= Body mass index is 26.16 kg/m .    General appearance: Patient is alert and fully cooperative with history & exam.  No sign of distress is noted during the visit.     Psychiatric: Affect is pleasant & appropriate.  Patient appears motivated to improve health.     Respiratory: Breathing is regular & unlabored while sitting.     HEENT: Hearing is intact to spoken word.  Speech is clear.  No gross evidence of visual impairment that would impact ambulation.     Dermatologic: Lateral border of the right great toenail is incurvated.  Localized redness and pain on palpation.     Vascular: DP & PT pulses are intact & regular bilaterally.  No significant edema or varicosities noted.  CFT and skin temperature is normal to both lower extremities.     Neurologic: Lower extremity sensation is intact to light touch.  No evidence of weakness or contracture in the lower extremities.  No evidence of neuropathy.     Musculoskeletal: Patient is ambulatory without assistive device or brace.  No gross ankle deformity noted.  No foot or ankle joint effusion is noted.     ASSESSMENT:    Ingrown toenail of right foot  Right foot pain     Medical Decision Making/Plan:  Reviewed patient's chart in [facility]. The potential causes and nature of an ingrown toenail  were discussed with the patient.  We reviewed the natural history/prognosis of the condition and potential risks if no treatment is provided.      Treatment options discussed included conservative management (oral antibiotics, soaking of foot, adequate width shoes)  as well as surgical management (partial or total nail removal).  The pros and cons of both forms of treatment were reviewed.      After thorough discussion and answering all questions, the patient elected to have the border removed.  She will soak the foot twice a day for 2 weeks and apply antibiotic ointment and a bandage..       Questions were answered to patient's satisfaction she will call further questions or concerns.    Procedure: After verbal consent, the right big toe was anesthetized with 5cc's of 1% lidocaine plain. A tourniquet was applied to the toe. The lateral border was then raised from the nail bed and then cut the length of the nail.  The offending nail border was then removed.  Bacitracin was applied to the nail bed.  The tourniquet was removed.  Bandage was applied to the toe.  The patient tolerated the procedure and anesthesia well.      Patient risk factor: Patient is at low risk for infection.        Danica Lundberg DPM, Podiatry/Foot and Ankle Surgery

## 2024-04-03 NOTE — LETTER
4/3/2024         RE: Coby Poe  [address]  [place] [address]        Dear Colleague,    Thank you for referring your patient, Coby Poe, to the [facility]. Please see a copy of my visit note below.    PATIENT HISTORY:  Deanna Forbes CNP requested I see this patient for their foot issue.  Coby Poe is a 59 year old female who presents to clinic for ingrown to right great toenail.  Notes it has been sore for the last few months.  States it was infected and she was on antibiotics and is doing better but it still painful at the tip.  Can be 5 out of 10 at its worst.  Worse with pressure.  Wondering what can be done for it.    Review of Systems:  Patient denies fever, chills, rash, wound, stiffness, limping, numbness, weakness, heart burn, blood in stool, chest pain with activity, calf pain when walking, shortness of breath with activity, chronic cough, easy bleeding/bruising, swelling of ankles, excessive thirst, fatigue, depression, anxiety. .     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 Provider to review     Female infertility of unspecified origin     has had one successful pregnancy      Fibroids 01/01/2009    large - resolved s/p supracervical hysterectomy      Routine gynecological examination     has yearly exams with Dr. Vaughn ob/gyn         PAST SURGICAL HISTORY:   Past Surgical History:   Procedure Laterality Date     EYE SURGERY  1970     HYSTERECTOMY, PAP STILL INDICATED  01/01/2009    lap. supracervical hyst with left ovary left in place      SURGICAL HISTORY OF -   at age 26    removal of right ovarian teratoma - atrophic right ovary  still in place      SURGICAL HISTORY OF -   at age 13     strabismus surgery on both eyes      SURGICAL HISTORY OF -       endometriosis surgery - with some laser - laparoscopic         MEDICATIONS:   Current Outpatient Medications:      cetirizine (ZYRTEC) 10 MG tablet, Take 10 mg by mouth daily, Disp: , Rfl:      estradiol (VIVELLE-DOT) 0.05 MG/24HR bi-weekly patch, Place 1 patch onto the skin twice a week, Disp: 24 patch, Rfl: 3     meclizine (ANTIVERT) 25 MG tablet, Take 1 tablet (25 mg) by mouth 3 times daily as needed for dizziness Vertigo, Disp: 30 tablet, Rfl: 1     Multiple Vitamin (MULTI-VITAMIN) per tablet, Take 1 tablet by mouth daily, Disp: 100 tablet, Rfl: 12     simvastatin (ZOCOR) 5 MG tablet, Take 1 tablet (5 mg) by mouth at bedtime, Disp: 90 tablet, Rfl: 3     Turmeric (QC TUMERIC COMPLEX PO), , Disp: , Rfl:      valACYclovir (VALTREX) 1000 mg tablet, Take 1 tablet (1,000 mg) by mouth 3 times daily, Disp: 21 tablet, Rfl: 3     VITAMIN D PO, , Disp: , Rfl:      ALLERGIES:  No Known Allergies     SOCIAL HISTORY:   Social History     Socioeconomic History     Marital status:      Spouse name: Ish      Number of children: 1     Years of education: 18     Highest education level: Not on file   Occupational History     Occupation: stay at home mom      Employer: NONE    Tobacco Use     Smoking status: Never     Smokeless tobacco: Never   Vaping Use     Vaping Use: Never used   Substance and Sexual Activity     Alcohol use: Not Currently     Comment: 2 to 3 drinks a year.     Drug use: No     Comment: no herbal meds either      Sexual activity: Yes     Partners: Male     Birth control/protection: Surgical     Comment: hysterectomy   Other Topics Concern      Service No     Blood Transfusions No     Caffeine Concern Yes     Comment: 2 cups of coffee daily , diet coke occasionally      Occupational Exposure Not Asked     Hobby Hazards Not Asked     Sleep Concern Not Asked     Stress Concern  Not Asked     Weight Concern Not Asked     Special Diet Not Asked     Back Care Not Asked     Exercise Yes     Comment: treadmill and running [place] - encouraged weight training 3x/week       Bike Helmet Not Asked     Seat Belt Yes     Comment: always      Self-Exams Yes     Comment: SBE encouraged monthly      Parent/sibling w/ CABG, MI or angioplasty before 65F 55M? Yes     Comment: mother angioplasty   Social History Narrative     Not on file     Social Determinants of Health     Financial Resource Strain: Low Risk  (12/20/2023)    Financial Resource Strain      Within the past 12 months, have you or your family members you live with been unable to get utilities (heat, electricity) when it was really needed?: No   Food Insecurity: Low Risk  (12/20/2023)    Food Insecurity      Within the past 12 months, did you worry that your food would run out before you got money to buy more?: No      Within the past 12 months, did the food you bought just not last and you didn t have money to get more?: No   Transportation Needs: Low Risk  (12/20/2023)    Transportation Needs      Within the past 12 months, has lack of transportation kept you from medical appointments, getting your medicines, non-medical meetings or appointments, work, or from getting things that you need?: No   Physical Activity: Not on file   Stress: Not on file   Social Connections: Not on file   Interpersonal Safety: Low Risk  (12/20/2023)    Interpersonal Safety      Do you feel physically and emotionally safe where you currently live?: Yes      Within the past 12 months, have you been hit, slapped, kicked or otherwise physically hurt by someone?: No      Within the past 12 months, have you been humiliated or emotionally abused in other ways by your partner or ex-partner?: No   Housing Stability: Low Risk  (12/20/2023)    Housing Stability      Do you have housing? : Yes      Are you worried about losing your housing?: No        FAMILY  HISTORY:   Family History   Problem Relation Age of Onset     Hypertension Mother              Heart Disease Mother      Lipids Mother      Thyroid Disease Mother      Cerebrovascular Disease Mother 80        first one at age 80, then 2 small ones after that      Hyperlipidemia Mother      Hypertension Father              Cancer Father 80        stage IV sarcoma of left triceps at age 80  - removed at [facility]- 2008     Lung Cancer Father      Breast Cancer Father         did genetic counseling = negative.      No Known Problems Sister      Diabetes Maternal Grandfather      Cerebrovascular Disease Maternal Grandfather      Cerebrovascular Disease Paternal Grandfather      Lipids Sister      No Known Problems Brother      Cardiac Sudden Death Maternal Grandmother 52        massive MI at age 52      Myocardial Infarction Paternal Grandmother 88         from first MI while playing cards      No Known Problems Other         EXAM:Vitals: Wt 70.8 kg (156 lb)   LMP 2008   BMI 26.16 kg/m    BMI= Body mass index is 26.16 kg/m .    General appearance: Patient is alert and fully cooperative with history & exam.  No sign of distress is noted during the visit.     Psychiatric: Affect is pleasant & appropriate.  Patient appears motivated to improve health.     Respiratory: Breathing is regular & unlabored while sitting.     HEENT: Hearing is intact to spoken word.  Speech is clear.  No gross evidence of visual impairment that would impact ambulation.     Dermatologic: Lateral border of the right great toenail is incurvated.  Localized redness and pain on palpation.     Vascular: DP & PT pulses are intact & regular bilaterally.  No significant edema or varicosities noted.  CFT and skin temperature is normal to both lower extremities.     Neurologic: Lower extremity sensation is intact to light touch.  No evidence of weakness or contracture in the lower extremities.  No evidence of neuropathy.      Musculoskeletal: Patient is ambulatory without assistive device or brace.  No gross ankle deformity noted.  No foot or ankle joint effusion is noted.     ASSESSMENT:    Ingrown toenail of right foot  Right foot pain     Medical Decision Making/Plan:  Review[facility] in [facility]. The potential causes and nature of an ingrown toenail were discussed with the patient.  We reviewed the natural history/prognosis of the condition and potential risks if no treatment is provided.      Treatment options discussed included conservative management (oral antibiotics, soaking of foot, adequate width shoes)  as well as surgical management (partial or total nail removal).  The pros and cons of both forms of treatment were reviewed.      After thorough discussion and answering all questions, the patient elected to have the border removed.  She will soak the foot twice a day for 2 weeks and apply antibiotic ointment and a bandage..       Questions were answered to patient's satisfaction she will call further questions or concerns.    Procedure: After verbal consent, the right big toe was anesthetized with 5cc's of 1% lidocaine plain. A tourniquet was applied to the toe. The lateral border was then raised from the nail bed and then cut the length of the nail.  The offending nail border was then removed.  Bacitracin was applied to the nail bed.  The tourniquet was removed.  Bandage was applied to the toe.  The patient tolerated the procedure and anesthesia well.      Patient risk factor: Patient is at low risk for infection.        Danica Lundberg DPM, Podiatry/Foot and Ankle Surgery      Again, thank you for allowing me to participate in the care of your patient.        Sincerely,        Danica Lundberg DPM, Podiatry/Foot and Ankle Surgery

## 2024-06-28 ENCOUNTER — ALLIED HEALTH/NURSE VISIT (OUTPATIENT)
Dept: FAMILY MEDICINE | Facility: CLINIC | Age: 59
End: 2024-06-28
Payer: COMMERCIAL

## 2024-06-28 DIAGNOSIS — Z23 ENCOUNTER FOR IMMUNIZATION: Primary | ICD-10-CM

## 2024-06-28 PROCEDURE — 90746 HEPB VACCINE 3 DOSE ADULT IM: CPT

## 2024-06-28 PROCEDURE — 99207 PR NO CHARGE NURSE ONLY: CPT

## 2024-06-28 PROCEDURE — 90471 IMMUNIZATION ADMIN: CPT

## 2024-06-28 NOTE — PROGRESS NOTES
Prior to immunization administration, verified patients identity using patient s name and date of birth. Please see Immunization Activity for additional information.     Screening Questionnaire for Adult Immunization    Are you sick today?   No   Do you have allergies to medications, food, a vaccine component or latex?   No   Have you ever had a serious reaction after receiving a vaccination?   No   Do you have a long-term health problem with heart, lung, kidney, or metabolic disease (e.g., diabetes), asthma, a blood disorder, no spleen, complement component deficiency, a cochlear implant, or a spinal fluid leak?  Are you on long-term aspirin therapy?   No   Do you have cancer, leukemia, HIV/AIDS, or any other immune system problem?   No   Do you have a parent, brother, or sister with an immune system problem?   No   In the past 3 months, have you taken medications that affect  your immune system, such as prednisone, other steroids, or anticancer drugs; drugs for the treatment of rheumatoid arthritis, Crohn s disease, or psoriasis; or have you had radiation treatments?   No   Have you had a seizure, or a brain or other nervous system problem?   No   During the past year, have you received a transfusion of blood or blood    products, or been given immune (gamma) globulin or antiviral drug?   No   For women: Are you pregnant or is there a chance you could become       pregnant during the next month?   No   Have you received any vaccinations in the past 4 weeks?   No     Immunization questionnaire answers were all negative.    I have reviewed the following standing orders:   This patient is due and qualifies for the Hepatitis B vaccine.    Click here for Hepatitis B Standing Order    I have reviewed the vaccines inclusion and exclusion criteria; No concerns regarding eligibility.     Patient instructed to remain in clinic for 15 minutes afterwards, and to report any adverse reactions.     Screening performed by Edelmira  JUAN F Barrios on 6/28/2024 at 1:23 PM.

## 2024-12-20 PROBLEM — H81.13 BENIGN PAROXYSMAL POSITIONAL VERTIGO, BILATERAL: Status: ACTIVE | Noted: 2024-12-20

## 2024-12-20 PROBLEM — B02.8 HERPES ZOSTER WITH COMPLICATION: Status: ACTIVE | Noted: 2024-12-20

## 2024-12-20 PROBLEM — Z80.3 FAMILY HISTORY OF BREAST CANCER: Status: ACTIVE | Noted: 2024-12-20

## 2024-12-23 ENCOUNTER — TELEPHONE (OUTPATIENT)
Dept: FAMILY MEDICINE | Facility: CLINIC | Age: 59
End: 2024-12-23
Payer: COMMERCIAL

## 2024-12-23 ENCOUNTER — PATIENT OUTREACH (OUTPATIENT)
Dept: CARE COORDINATION | Facility: CLINIC | Age: 59
End: 2024-12-23
Payer: COMMERCIAL

## 2024-12-23 DIAGNOSIS — N95.1 VASOMOTOR SYMPTOMS DUE TO MENOPAUSE: ICD-10-CM

## 2024-12-23 DIAGNOSIS — R17 ELEVATED BILIRUBIN: Primary | ICD-10-CM

## 2024-12-23 NOTE — TELEPHONE ENCOUNTER
Gilda from [facility] calling to report that - CBC w/ platelets order had to be cancelled, due to not enough blood in specimen to collect.    Please reorder CBC.    Will have to call pt to re-draw once ordered.

## 2024-12-24 NOTE — TELEPHONE ENCOUNTER
Placed call to patient to advise of message below.    Pt declined to schedule lab only at this time, will consider after the holidays.

## 2024-12-24 NOTE — TELEPHONE ENCOUNTER
Left message to call back.     When patient calls back - please advise there wasn't enough blood in specimen needs to have CBC redrawn. Please help schedule lab only appointment

## 2025-01-16 DIAGNOSIS — N95.1 VASOMOTOR SYMPTOMS DUE TO MENOPAUSE: ICD-10-CM

## 2025-01-16 DIAGNOSIS — N95.1 SYMPTOMS, SUCH AS FLUSHING, SLEEPLESSNESS, HEADACHE, LACK OF CONCENTRATION, ASSOCIATED WITH THE MENOPAUSE: ICD-10-CM

## 2025-01-16 RX ORDER — ESTRADIOL 0.05 MG/D
PATCH, EXTENDED RELEASE TRANSDERMAL
Qty: 24 PATCH | Refills: 2 | Status: SHIPPED | OUTPATIENT
Start: 2025-01-16

## 2025-02-03 ENCOUNTER — MYC MEDICAL ADVICE (OUTPATIENT)
Dept: FAMILY MEDICINE | Facility: CLINIC | Age: 60
End: 2025-02-03
Payer: COMMERCIAL

## 2025-02-03 DIAGNOSIS — E78.5 HYPERLIPIDEMIA LDL GOAL <130: Primary | ICD-10-CM

## 2025-02-03 NOTE — TELEPHONE ENCOUNTER
LOV 12/20/24    Currently on simvastatin 5 mg      Routing to provider to review and advise.     Lita Jeffrey RN   [facility]

## 2025-02-05 RX ORDER — ATORVASTATIN CALCIUM 10 MG/1
10 TABLET, FILM COATED ORAL DAILY
Qty: 90 TABLET | Refills: 3 | Status: SHIPPED | OUTPATIENT
Start: 2025-02-05

## 2025-02-18 ENCOUNTER — ANCILLARY PROCEDURE (OUTPATIENT)
Dept: MAMMOGRAPHY | Facility: CLINIC | Age: 60
End: 2025-02-18
Attending: FAMILY MEDICINE
Payer: COMMERCIAL

## 2025-02-18 ENCOUNTER — ANCILLARY PROCEDURE (OUTPATIENT)
Dept: BONE DENSITY | Facility: CLINIC | Age: 60
End: 2025-02-18
Attending: FAMILY MEDICINE
Payer: COMMERCIAL

## 2025-02-18 DIAGNOSIS — Z12.31 VISIT FOR SCREENING MAMMOGRAM: ICD-10-CM

## 2025-02-18 DIAGNOSIS — N95.1 SYMPTOMS, SUCH AS FLUSHING, SLEEPLESSNESS, HEADACHE, LACK OF CONCENTRATION, ASSOCIATED WITH THE MENOPAUSE: ICD-10-CM

## 2025-02-18 DIAGNOSIS — N95.1 SYMPTOMATIC MENOPAUSAL OR FEMALE CLIMACTERIC STATES: ICD-10-CM

## 2025-02-18 PROCEDURE — 77080 DXA BONE DENSITY AXIAL: CPT | Mod: TC | Performed by: PHYSICIAN ASSISTANT

## 2025-02-18 PROCEDURE — 77067 SCR MAMMO BI INCL CAD: CPT | Mod: TC | Performed by: RADIOLOGY

## 2025-02-18 PROCEDURE — 77063 BREAST TOMOSYNTHESIS BI: CPT | Mod: TC | Performed by: RADIOLOGY

## 2025-03-05 ENCOUNTER — OFFICE VISIT (OUTPATIENT)
Dept: FAMILY MEDICINE | Facility: CLINIC | Age: 60
End: 2025-03-05
Payer: COMMERCIAL

## 2025-03-05 VITALS
BODY MASS INDEX: 25.44 KG/M2 | WEIGHT: 149 LBS | OXYGEN SATURATION: 100 % | HEIGHT: 64 IN | SYSTOLIC BLOOD PRESSURE: 110 MMHG | RESPIRATION RATE: 18 BRPM | TEMPERATURE: 96.2 F | HEART RATE: 84 BPM | DIASTOLIC BLOOD PRESSURE: 72 MMHG

## 2025-03-05 DIAGNOSIS — M79.10 MYALGIA: ICD-10-CM

## 2025-03-05 DIAGNOSIS — Z87.59 HISTORY OF GESTATIONAL HYPERTENSION: ICD-10-CM

## 2025-03-05 DIAGNOSIS — E78.5 HYPERLIPIDEMIA LDL GOAL <130: Primary | ICD-10-CM

## 2025-03-05 PROCEDURE — 3078F DIAST BP <80 MM HG: CPT | Performed by: FAMILY MEDICINE

## 2025-03-05 PROCEDURE — 99214 OFFICE O/P EST MOD 30 MIN: CPT | Performed by: FAMILY MEDICINE

## 2025-03-05 PROCEDURE — 3074F SYST BP LT 130 MM HG: CPT | Performed by: FAMILY MEDICINE

## 2025-03-05 RX ORDER — ROSUVASTATIN CALCIUM 5 MG/1
5 TABLET, COATED ORAL DAILY
Qty: 90 TABLET | Refills: 1 | Status: SHIPPED | OUTPATIENT
Start: 2025-03-05

## 2025-03-05 NOTE — PROGRESS NOTES
" [facility]  [address] [place] [address]  Office: [phone]   Fax:    [phone]       Assessment & Plan :       ICD-10-CM    1. Hyperlipidemia LDL goal <130  E78.5 rosuvastatin (CRESTOR) 5 MG tablet     CT Coronary Calcium Scan      2. History of gestational hypertension  Z87.59       3. Myalgia  M79.10         Please, call our clinic or go to the ER immediately if signs or symptoms worsen or fail to improve as anticipated.    Return in about 2 months (around 5/5/2025) for cholesterol, as lab only appt.    Pt desires CT calcium scan and Carotid US to check for any significant plaques with pt's hyperlipidemia.   Assessment & Plan  Hyperlipidemia LDL goal <130  - Atorvastatin 10 mg may be causing myalgia. Switch to rosuvastatin 5 mg, which is water-soluble and may reduce muscle aches.  - Stop atorvastatin 10 mg. Start rosuvastatin 5 mg. Follow up with blood work in 8 weeks to check cholesterol levels. Consider Coenzyme Q10 100 mg daily to help liver metabolism of cholesterol. Look for one with USP symbol.     History of gestational hypertension  - Slightly higher risk of benign hypertension later in life due to preeclampsia history. Current blood pressure is 110/72, showing no hypertension now.  - Check blood pressure regularly. No immediate action needed.    Myalgia  - Myalgia might be linked to atorvastatin use.  - Stop atorvastatin and switch to rosuvastatin. Do knee exercises to ease discomfort. Watch symptoms and report any changes.        BMI:   Estimated body mass index is 25.58 kg/m  as calculated from the following:    Height as of this encounter: 1.626 m (5' 4\").    Weight as of this encounter: 67.6 kg (149 lb).   Weight management plan: Discussed healthy diet and exercise guidelines      MEDICATIONS:   Orders Placed This Encounter   Medications    rosuvastatin (CRESTOR) 5 MG tablet     Sig: Take 1 tablet (5 mg) by mouth daily.     Dispense:  90 tablet     Refill: "  1     Please discontinue atorvastatin - need to change to rosuvastatin due to side effects          - Continue other medications without change  Work on weight loss  Regular exercise  See Patient Instructions       Steph Gutierrez MD      Subjective   Coby is a 59 year old, presenting for the following health issues:  Musculoskeletal Problem  and the following other medical problems:      1. Hyperlipidemia LDL goal <130    2. History of gestational hypertension    3. Myalgia- most likely mild quadriceps tendonitis vs. ? baker's cysts vs. other - could be from atorvastatin            3/5/2025     8:05 AM   Additional Questions   Roomed by Cynthia WRAY   Accompanied by self     Musculoskeletal Problem    History of Present Illness       Reason for visit:  Pain or weird feeling behind knees especially at night or upon waking. Tingle feeling in tongue the morning I thought Ish was having another stroke. Lots of stress lately.  Symptom onset:  1-2 weeks ago  Symptoms include:  Weird pain or fullness behind knees. pinky toe goes numb at night sometimes.  Tingle pin pricks on tongue the morning we thought Ish was having another stroke.  Symptom intensity:  Moderate  Symptom progression:  Staying the same  Had these symptoms before:  No   She is taking medications regularly.        History of Present Illness-  Coby, 59 years    Knee Pain  She has pain behind both knees, mostly at night. It feels like fullness or as if there is a lot of paper behind the knees. She didn't take her atorvastatin the night before the visit and felt fine in the morning. She wonders if atorvastatin is causing this pain.    Numbness and Tingling  In January, around the 22nd, she had numbness in her tongue with pinprick sensations and numbness in her pinky toe. This happened during a stressful time when her  had a stroke. She hasn't had these symptoms since then.    Cholesterol Management  She is managing her cholesterol with  atorvastatin. She tried simvastatin before, which lowered her cholesterol slightly. Her  and sister also take atorvastatin. She is worried about the effectiveness of her current medication and is thinking about alternatives. She is also considering Coenzyme Q10 to help with cholesterol management.    Blood Pressure History  She remembers having high blood pressure during pregnancy, specifically preeclampsia. She doesn't have high blood pressure now.    Lifestyle and Concerns  She has stopped drinking alcohol since Christmas and coffee since her 's stroke 1/2025. . She is working out and watching her diet to manage her cholesterol levels. She is concerned about her cholesterol not decreasing despite these efforts.      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 Vaughn -ob/gyn     Other chronic serous otitis media    EDEMA - right lower extremity - after  dermoid teratoma in high school    Uterine leiomyoma    Screening for cervical cancer- repeat in 2025 - s/p LSH with right oophorectomy in 2009 - left ovary in place      Hyperlipidemia LDL goal <130    Elevated bilirubin - 1.6 from labs from 10/3/2022 - recheck 12/11/2023 = normal    Symptoms, such as flushing, sleeplessness, headache, lack of concentration, associated with the menopause - discussed HRT - pt desires estradiol - s/p LSH in 2009    Trapezius muscle spasm-happened with pouring sand from 40lbs bag on 9/29/2022     Screen for colon cancer - negative cologuard 8/2023 - repeat in 2026 or do colonoscopy - recommended    Overweight (BMI 25.0-29.9)- pt desires 10-15 lbs weight loss    Vasomotor symptoms due to menopause- hot flashes and night sweats    Symptomatic menopausal or female climacteric states    Family history of breast cancer-father- general practitioner did xray in his office -also a sarcoma prior to his breast ca w/  radiation R upper arm and then lung cancer as well - mother was a smoker    Benign paroxysmal positional vertigo, bilateral- requests refill on hold for meclizine - no problems recently, but can recur without warning    Herpes zoster with complication - stable - needs refills on meds    Myalgia- most likely mild quadriceps tendonitis vs. ? baker's cysts vs. other - could be from atorvastatin    History of gestational hypertension       Current Outpatient Medications   Medication Sig Dispense Refill    cetirizine (ZYRTEC) 10 MG tablet Take 10 mg by mouth daily      estradiol (VIVELLE-DOT) 0.05 MG/24HR bi-weekly patch APPLY 1 PATCH ONTO SKIN TWICE PER WEEK 24 patch 2    rosuvastatin (CRESTOR) 5 MG tablet Take 1 tablet (5 mg) by mouth daily. 90 tablet 1    Turmeric (QC TUMERIC COMPLEX PO)       valACYclovir (VALTREX) 1000 mg tablet Take 1 tablet (1,000 mg) by mouth 3 times daily. 21 tablet 3    VITAMIN D PO       promethazine (PHENERGAN) 25 MG tablet Take 1 tablet (25 mg) by mouth every 8 hours as needed for nausea. 30 tablet 4        No Known Allergies         Hyperlipidemia Follow-Up    Are you regularly taking any medication or supplement to lower your cholesterol?   Yes- atorvastatin 10mg daily   Are you having muscle aches or other side effects that you think could be caused by your cholesterol lowering medication?  Yes- ? - see above.   How many servings of fruits and vegetables do you eat daily?  4 or more  On average, how many sweetened beverages do you drink each day (Examples: soda, juice, sweet tea, etc.  Do NOT count diet or artificially sweetened beverages)?   0  How many days per week do you exercise enough to make your heart beat faster? 5  How many minutes a day do you exercise enough to make your heart beat faster? 30 - 60  How many days per week do you miss taking your medication? 0      Review of Systems  Constitutional, HEENT, cardiovascular, pulmonary, GI, , musculoskeletal, neuro, skin,  "endocrine and psych systems are negative, except as otherwise noted.      Objective    /72   Pulse 84   Temp (!) 96.2  F (35.7  C) (Tympanic)   Resp 18   Ht 1.626 m (5' 4\")   Wt 67.6 kg (149 lb)   LMP 03/19/2008   SpO2 100%   BMI 25.58 kg/m    Body mass index is 25.58 kg/m .  Physical Exam   GENERAL: alert and no distress  EYES: Eyes grossly normal to inspection, PERRL and conjunctivae and sclerae normal  HENT: ear canals and TM's normal, nose and mouth without ulcers or lesions  NECK: no adenopathy, no asymmetry, masses, or scars  RESP: lungs clear to auscultation - no rales, rhonchi or wheezes  CV: regular rate and rhythm, normal S1 S2, no S3 or S4, no murmur, click or rub, no peripheral edema  CV: no significant peripheral pitting edema on exam today ? Very slight trace to lower pretibial area on the left and no bruits heard in the bilateral carotid arteries.   ABDOMEN: soft, nontender, no hepatosplenomegaly, no masses and bowel sounds normal  MS: no gross musculoskeletal defects noted, no edema  SKIN: no suspicious lesions or rashes  NEURO: Normal strength and tone, mentation intact and speech normal  PSYCH: mentation appears normal, affect normal/bright    Office Visit on 12/20/2024   Component Date Value Ref Range Status    TSH 12/20/2024 3.14  0.30 - 4.20 uIU/mL Final    Cholesterol 12/20/2024 186  <200 mg/dL Final    Triglycerides 12/20/2024 66  <150 mg/dL Final    Direct Measure HDL 12/20/2024 63  >=50 mg/dL Final    LDL Cholesterol Calculated 12/20/2024 110 (H)  <100 mg/dL Final    Non HDL Cholesterol 12/20/2024 123  <130 mg/dL Final    Patient Fasting > 8hrs? 12/20/2024 Yes   Final    Sodium 12/20/2024 139  135 - 145 mmol/L Final    Potassium 12/20/2024 4.4  3.4 - 5.3 mmol/L Final    Carbon Dioxide (CO2) 12/20/2024 25  22 - 29 mmol/L Final    Anion Gap 12/20/2024 9  7 - 15 mmol/L Final    Urea Nitrogen 12/20/2024 18.5  8.0 - 23.0 mg/dL Final    Creatinine 12/20/2024 0.77  0.51 - 0.95 mg/dL " Final    GFR Estimate 12/20/2024 88  >60 mL/min/1.73m2 Final    eGFR calculated using 2021 CKD-EPI equation.    Calcium 12/20/2024 9.2  8.8 - 10.4 mg/dL Final    Reference intervals for this test were updated on 7/16/2024 to reflect our healthy population more accurately. There may be differences in the flagging of prior results with similar values performed with this method. Those prior results can be interpreted in the context of the updated reference intervals.    Chloride 12/20/2024 105  98 - 107 mmol/L Final    Glucose 12/20/2024 89  70 - 99 mg/dL Final    Alkaline Phosphatase 12/20/2024 45  40 - 150 U/L Final    AST 12/20/2024 23  0 - 45 U/L Final    ALT 12/20/2024 11  0 - 50 U/L Final    Protein Total 12/20/2024 6.7  6.4 - 8.3 g/dL Final    Albumin 12/20/2024 4.4  3.5 - 5.2 g/dL Final    Bilirubin Total 12/20/2024 0.9  <=1.2 mg/dL Final    Patient Fasting > 8hrs? 12/20/2024 Yes   Final    Creatinine Urine mg/dL 12/20/2024 135.0  mg/dL Final    The reference ranges have not been established in urine creatinine. The results should be integrated into the clinical context for interpretation.    Albumin Urine mg/L 12/20/2024 <12.0  mg/L Final    The reference ranges have not been established in urine albumin. The results should be integrated into the clinical context for interpretation.    Albumin Urine mg/g Cr 12/20/2024    Final    Unable to calculate, urine albumin and/or urine creatinine is outside detectable limits.  Microalbuminuria is defined as an albumin:creatinine ratio of 17 to 299 for males and 25 to 299 for females. A ratio of albumin:creatinine of 300 or higher is indicative of overt proteinuria.  Due to biologic variability, positive results should be confirmed by a second, first-morning random or 24-hour timed urine specimen. If there is discrepancy, a third specimen is recommended. When 2 out of 3 results are in the microalbuminuria range, this is evidence for incipient nephropathy and warrants  increased efforts at glucose control, blood pressure control, and institution of therapy with an angiotensin-converting-enzyme (ACE) inhibitor (if the patient can tolerate it).             Signed Electronically by: Steph Gutierrez MD

## 2025-03-05 NOTE — PATIENT INSTRUCTIONS
" [facility]  [address] [place] [address]  Office: [phone]   Fax:    [phone]       Please come in fasting :  nothing to eat for at least 8 , preferably 12 hours ahead of appointment. Please do come in well hydrated though - ok to  have water, black coffee or plain tea, BUT NO creamers or sweeteners of any kind, please.        Return in about 2 months (around 5/5/2025) for cholesterol, as lab only appt.      We've entered future orders for this and you can do this as a lab only appointment at our clinic.  Please call [phone] and press #2 to speak with your care team to schedule this or you can schedule it on [employer] at your convenience.  You can also have this done at any [facility] without appointment during regular outpatient lab hours.   [facility] outpatient lab hours 7am-7pm Monday-Friday, and 9am -noon Saturdays and Sundays .  They close promptly at 7pm weekdays and 12 noon on the weekends.     Thank you so much or choosing [facility]  for your Health Care. It was a pleasure seeing you at your visit today! Please contact us with any questions or concerns you may have.                   Steph Gutierrez MD                              To reach your [facility] care team after hours call:   [phone] press #2 \"to speak with your care team\".  This will get you to our clinic instead of routing to central [facility]  scheduling.     PLEASE NOTE OUR HOURS HAVE CHANGED secondary to COVID-19 coronavirus pandemic, as we are trying to minimize patient exposure to the virus,  which is now widespread in the [facility].  These hours may change with very little notice.  We apologize for any inconvenience.       Our current clinic hours are:          Monday- Thursday   7:00am - 6:00pm  in person.      Friday  7:00am- 5:00pm                       Saturday and Sunday : Closed to in person and virtual " visits        We have telephone and virtual visit times available between    7:00am - 6pm on Monday-Friday as well.                                                Phone:  [phone]      Our pharmacy hours: Monday through Friday 8:00am to 5:00pm                        Saturday - 9:00 am to 12 noon       Sunday : Closed.              Phone:  [phone]              ###  Please note: at this time we are not accepting any walk-in visits. ###      There is also information available at our web site:  [email or web address]    If your provider ordered any lab tests and you do not receive the results within 10 business days, please call the clinic.    If you need a medication refill please contact your pharmacy.  Please allow 3 business days for your refill to be completed.    Our clinic offers telephone visits and e visits.  Please ask one of your team members to explain more.      Use [employer] (secure email communication and access to your chart) to send your primary care provider a message or make an appointment. Ask someone on your Team how to sign up for [employer].              Please, call our clinic or go to the ER immediately if signs or symptoms worsen or fail to improve as anticipated.

## 2025-03-20 ENCOUNTER — TELEPHONE (OUTPATIENT)
Dept: FAMILY MEDICINE | Facility: CLINIC | Age: 60
End: 2025-03-20
Payer: COMMERCIAL

## 2025-03-20 NOTE — TELEPHONE ENCOUNTER
[facility] calling stating patient had reached out to them for a cost estimate for the calcium CT scan. She states this test using code 20938 requires a PA and can call the PA dept at [phone].    Dr. Gutierrez, I thought this test is just not a covered test and is always cash pay. I was not sure what you had discussed with the patient. Please advise and send this note to appropriate party.    Su Frazier,

## 2025-03-25 NOTE — TELEPHONE ENCOUNTER
Patient notified this test is self pay, encouraged her to call imaging center for pricing.  Su Frazier,

## 2025-08-03 DIAGNOSIS — N95.1 VASOMOTOR SYMPTOMS DUE TO MENOPAUSE: ICD-10-CM

## 2025-08-03 DIAGNOSIS — N95.1 SYMPTOMS, SUCH AS FLUSHING, SLEEPLESSNESS, HEADACHE, LACK OF CONCENTRATION, ASSOCIATED WITH THE MENOPAUSE: ICD-10-CM

## 2025-08-04 RX ORDER — ESTRADIOL 0.05 MG/D
PATCH, EXTENDED RELEASE TRANSDERMAL
Qty: 25 PATCH | OUTPATIENT
Start: 2025-08-04

## 2025-08-28 DIAGNOSIS — E78.5 HYPERLIPIDEMIA LDL GOAL <130: ICD-10-CM

## 2025-08-28 RX ORDER — ROSUVASTATIN CALCIUM 5 MG/1
5 TABLET, COATED ORAL DAILY
Qty: 90 TABLET | Refills: 1 | Status: SHIPPED | OUTPATIENT
Start: 2025-08-28